# Patient Record
Sex: FEMALE | Race: WHITE | Employment: UNEMPLOYED | URBAN - METROPOLITAN AREA
[De-identification: names, ages, dates, MRNs, and addresses within clinical notes are randomized per-mention and may not be internally consistent; named-entity substitution may affect disease eponyms.]

---

## 2017-03-02 PROBLEM — M77.51 TENDINITIS OF RIGHT ANKLE: Status: ACTIVE | Noted: 2017-03-02

## 2017-07-02 PROBLEM — M77.51 TENDINITIS OF RIGHT ANKLE: Status: RESOLVED | Noted: 2017-03-02 | Resolved: 2017-07-02

## 2017-07-02 PROBLEM — R50.9 FUO (FEVER OF UNKNOWN ORIGIN): Status: ACTIVE | Noted: 2017-07-02

## 2017-07-02 PROBLEM — N17.9 ACUTE KIDNEY INJURY (HCC): Status: ACTIVE | Noted: 2017-07-02

## 2018-02-18 PROBLEM — J18.9 PNEUMONIA DUE TO ORGANISM: Status: ACTIVE | Noted: 2018-02-18

## 2018-02-18 PROBLEM — J18.9 PNEUMONIA: Status: ACTIVE | Noted: 2018-02-18

## 2018-02-19 PROBLEM — R50.9 FUO (FEVER OF UNKNOWN ORIGIN): Status: RESOLVED | Noted: 2017-07-02 | Resolved: 2018-02-19

## 2018-02-19 PROBLEM — I10 ESSENTIAL HYPERTENSION: Chronic | Status: ACTIVE | Noted: 2018-02-19

## 2018-02-19 PROBLEM — N17.9 ACUTE KIDNEY INJURY (HCC): Status: RESOLVED | Noted: 2017-07-02 | Resolved: 2018-02-19

## 2018-02-19 PROBLEM — G47.33 OSA (OBSTRUCTIVE SLEEP APNEA): Chronic | Status: ACTIVE | Noted: 2018-02-19

## 2018-02-19 PROBLEM — J18.9 PNEUMONIA DUE TO ORGANISM: Status: RESOLVED | Noted: 2018-02-18 | Resolved: 2018-02-19

## 2018-02-19 PROBLEM — J10.1 INFLUENZA B: Status: ACTIVE | Noted: 2018-02-19

## 2018-02-20 PROBLEM — A41.9 SEPSIS (HCC): Status: ACTIVE | Noted: 2018-02-20

## 2018-03-16 ENCOUNTER — HOSPITAL ENCOUNTER (OUTPATIENT)
Dept: GENERAL RADIOLOGY | Age: 43
Discharge: HOME | End: 2018-03-18
Payer: COMMERCIAL

## 2018-03-16 ENCOUNTER — HOSPITAL ENCOUNTER (OUTPATIENT)
Age: 43
Discharge: HOME | End: 2018-03-18
Payer: COMMERCIAL

## 2018-03-16 DIAGNOSIS — C90.00 MYELOMA ASSOCIATED AMYLOIDOSIS (HCC): ICD-10-CM

## 2018-03-16 DIAGNOSIS — E85.9 MYELOMA ASSOCIATED AMYLOIDOSIS (HCC): ICD-10-CM

## 2018-03-16 PROCEDURE — 71046 X-RAY EXAM CHEST 2 VIEWS: CPT

## 2019-03-02 ENCOUNTER — HOSPITAL ENCOUNTER (EMERGENCY)
Age: 44
Discharge: HOME | End: 2019-03-02
Attending: EMERGENCY MEDICINE
Payer: COMMERCIAL

## 2019-03-02 ENCOUNTER — APPOINTMENT (OUTPATIENT)
Dept: CT IMAGING | Age: 44
End: 2019-03-02
Payer: COMMERCIAL

## 2019-03-02 VITALS
DIASTOLIC BLOOD PRESSURE: 64 MMHG | HEIGHT: 67 IN | HEART RATE: 68 BPM | SYSTOLIC BLOOD PRESSURE: 136 MMHG | TEMPERATURE: 98.2 F | RESPIRATION RATE: 18 BRPM | OXYGEN SATURATION: 98 % | BODY MASS INDEX: 41.59 KG/M2 | WEIGHT: 265 LBS

## 2019-03-02 DIAGNOSIS — D64.89 OTHER SPECIFIED ANEMIAS: ICD-10-CM

## 2019-03-02 DIAGNOSIS — R10.84 GENERALIZED ABDOMINAL PAIN: Primary | ICD-10-CM

## 2019-03-02 LAB
ALBUMIN SERPL-MCNC: 3.6 G/DL (ref 3.5–5.2)
ALP BLD-CCNC: 90 U/L (ref 35–104)
ALT SERPL-CCNC: 10 U/L (ref 0–32)
ANION GAP SERPL CALCULATED.3IONS-SCNC: 8 MMOL/L (ref 7–16)
APTT: 88.7 SEC (ref 25.7–34.7)
AST SERPL-CCNC: 10 U/L (ref 0–31)
BACTERIA: ABNORMAL /HPF
BILIRUB SERPL-MCNC: 0.3 MG/DL (ref 0–1.2)
BILIRUBIN URINE: NEGATIVE
BLOOD, URINE: ABNORMAL
BUN BLDV-MCNC: 7 MG/DL (ref 6–20)
CALCIUM SERPL-MCNC: 8.8 MG/DL (ref 8.6–10.2)
CHLORIDE BLD-SCNC: 110 MMOL/L (ref 98–107)
CLARITY: ABNORMAL
CO2: 23 MMOL/L (ref 22–29)
COLOR: YELLOW
CREAT SERPL-MCNC: 0.7 MG/DL (ref 0.5–1)
EPITHELIAL CELLS, UA: ABNORMAL /HPF
GFR AFRICAN AMERICAN: >60
GFR NON-AFRICAN AMERICAN: >60 ML/MIN/1.73
GLUCOSE BLD-MCNC: 91 MG/DL (ref 74–99)
GLUCOSE URINE: NEGATIVE MG/DL
INR BLD: 1
KETONES, URINE: NEGATIVE MG/DL
LEUKOCYTE ESTERASE, URINE: NEGATIVE
NITRITE, URINE: NEGATIVE
PH UA: 6.5 (ref 5–9)
POTASSIUM SERPL-SCNC: 4.2 MMOL/L (ref 3.5–5)
PROTEIN UA: ABNORMAL MG/DL
PROTHROMBIN TIME: 11.6 SEC (ref 9.3–12.4)
RBC UA: ABNORMAL /HPF (ref 0–2)
SODIUM BLD-SCNC: 141 MMOL/L (ref 132–146)
SPECIFIC GRAVITY UA: <=1.005 (ref 1–1.03)
TOTAL PROTEIN: 6.3 G/DL (ref 6.4–8.3)
UROBILINOGEN, URINE: 0.2 E.U./DL
WBC UA: ABNORMAL /HPF (ref 0–5)

## 2019-03-02 PROCEDURE — 2580000003 HC RX 258: Performed by: EMERGENCY MEDICINE

## 2019-03-02 PROCEDURE — 85025 COMPLETE CBC W/AUTO DIFF WBC: CPT

## 2019-03-02 PROCEDURE — 6360000004 HC RX CONTRAST MEDICATION: Performed by: RADIOLOGY

## 2019-03-02 PROCEDURE — 36415 COLL VENOUS BLD VENIPUNCTURE: CPT

## 2019-03-02 PROCEDURE — 80053 COMPREHEN METABOLIC PANEL: CPT

## 2019-03-02 PROCEDURE — 99284 EMERGENCY DEPT VISIT MOD MDM: CPT

## 2019-03-02 PROCEDURE — 85730 THROMBOPLASTIN TIME PARTIAL: CPT

## 2019-03-02 PROCEDURE — 74177 CT ABD & PELVIS W/CONTRAST: CPT

## 2019-03-02 PROCEDURE — 87088 URINE BACTERIA CULTURE: CPT

## 2019-03-02 PROCEDURE — 85610 PROTHROMBIN TIME: CPT

## 2019-03-02 PROCEDURE — 81001 URINALYSIS AUTO W/SCOPE: CPT

## 2019-03-02 RX ORDER — SODIUM CHLORIDE 0.9 % (FLUSH) 0.9 %
10 SYRINGE (ML) INJECTION PRN
Status: DISCONTINUED | OUTPATIENT
Start: 2019-03-02 | End: 2019-03-03 | Stop reason: HOSPADM

## 2019-03-02 RX ORDER — 0.9 % SODIUM CHLORIDE 0.9 %
1000 INTRAVENOUS SOLUTION INTRAVENOUS ONCE
Status: COMPLETED | OUTPATIENT
Start: 2019-03-02 | End: 2019-03-02

## 2019-03-02 RX ADMIN — IOPAMIDOL 100 ML: 755 INJECTION, SOLUTION INTRAVENOUS at 20:05

## 2019-03-02 RX ADMIN — IOPAMIDOL 100 ML: 755 INJECTION, SOLUTION INTRAVENOUS at 22:51

## 2019-03-02 RX ADMIN — SODIUM CHLORIDE 1000 ML: 9 INJECTION, SOLUTION INTRAVENOUS at 19:30

## 2019-03-02 ASSESSMENT — PAIN DESCRIPTION - DESCRIPTORS: DESCRIPTORS: DISCOMFORT;DULL;SORE

## 2019-03-02 ASSESSMENT — PAIN DESCRIPTION - PROGRESSION: CLINICAL_PROGRESSION: NOT CHANGED

## 2019-03-02 ASSESSMENT — PAIN DESCRIPTION - LOCATION: LOCATION: ABDOMEN

## 2019-03-02 ASSESSMENT — PAIN DESCRIPTION - PAIN TYPE: TYPE: ACUTE PAIN

## 2019-03-02 ASSESSMENT — PAIN DESCRIPTION - ONSET: ONSET: GRADUAL

## 2019-03-02 ASSESSMENT — PAIN SCALES - GENERAL: PAINLEVEL_OUTOF10: 6

## 2019-03-02 ASSESSMENT — PAIN DESCRIPTION - FREQUENCY: FREQUENCY: CONTINUOUS

## 2019-03-03 LAB
ANISOCYTOSIS: ABNORMAL
BASOPHILS ABSOLUTE: 0.04 E9/L (ref 0–0.2)
BASOPHILS RELATIVE PERCENT: 1.2 % (ref 0–2)
EOSINOPHILS ABSOLUTE: 0.05 E9/L (ref 0.05–0.5)
EOSINOPHILS RELATIVE PERCENT: 1.4 % (ref 0–6)
HCT VFR BLD CALC: 27.6 % (ref 34–48)
HEMOGLOBIN: 7.9 G/DL (ref 11.5–15.5)
HYPOCHROMIA: ABNORMAL
IMMATURE GRANULOCYTES #: 0.01 E9/L
IMMATURE GRANULOCYTES %: 0.3 % (ref 0–5)
LYMPHOCYTES ABSOLUTE: 2.16 E9/L (ref 1.5–4)
LYMPHOCYTES RELATIVE PERCENT: 62.6 % (ref 20–42)
MCH RBC QN AUTO: 23.5 PG (ref 26–35)
MCHC RBC AUTO-ENTMCNC: 28.6 % (ref 32–34.5)
MCV RBC AUTO: 82.1 FL (ref 80–99.9)
MONOCYTES ABSOLUTE: 0.24 E9/L (ref 0.1–0.95)
MONOCYTES RELATIVE PERCENT: 7 % (ref 2–12)
NEUTROPHILS ABSOLUTE: 0.95 E9/L (ref 1.8–7.3)
NEUTROPHILS RELATIVE PERCENT: 27.5 % (ref 43–80)
OVALOCYTES: ABNORMAL
PDW BLD-RTO: 21.2 FL (ref 11.5–15)
PLATELET # BLD: 215 E9/L (ref 130–450)
PMV BLD AUTO: 9.5 FL (ref 7–12)
POIKILOCYTES: ABNORMAL
POLYCHROMASIA: ABNORMAL
RBC # BLD: 3.36 E12/L (ref 3.5–5.5)
TEAR DROP CELLS: ABNORMAL
WBC # BLD: 3.5 E9/L (ref 4.5–11.5)

## 2019-03-05 LAB — URINE CULTURE, ROUTINE: NORMAL

## 2019-06-11 ENCOUNTER — HOSPITAL ENCOUNTER (OUTPATIENT)
Dept: GENERAL RADIOLOGY | Age: 44
Discharge: HOME | End: 2019-06-13
Payer: COMMERCIAL

## 2019-06-11 ENCOUNTER — HOSPITAL ENCOUNTER (OUTPATIENT)
Age: 44
Discharge: HOME | End: 2019-06-13
Payer: COMMERCIAL

## 2019-06-11 DIAGNOSIS — M25.571 PAIN, JOINT, FOOT, RIGHT: ICD-10-CM

## 2019-06-11 PROCEDURE — 73630 X-RAY EXAM OF FOOT: CPT

## 2019-06-18 PROBLEM — E51.9: Status: ACTIVE | Noted: 2019-06-18

## 2019-09-18 ENCOUNTER — HOSPITAL ENCOUNTER (INPATIENT)
Age: 44
LOS: 3 days | Discharge: HOME | DRG: 660 | End: 2019-09-21
Attending: EMERGENCY MEDICINE | Admitting: FAMILY MEDICINE
Payer: COMMERCIAL

## 2019-09-18 ENCOUNTER — APPOINTMENT (OUTPATIENT)
Dept: GENERAL RADIOLOGY | Age: 44
DRG: 660 | End: 2019-09-18
Payer: COMMERCIAL

## 2019-09-18 DIAGNOSIS — R50.81 NEUTROPENIC FEVER (HCC): Primary | ICD-10-CM

## 2019-09-18 DIAGNOSIS — D70.9 NEUTROPENIC FEVER (HCC): Primary | ICD-10-CM

## 2019-09-18 DIAGNOSIS — R09.02 HYPOXIA: ICD-10-CM

## 2019-09-18 LAB
ALBUMIN SERPL-MCNC: 3.9 G/DL (ref 3.5–5.2)
ALP BLD-CCNC: 92 U/L (ref 35–104)
ALT SERPL-CCNC: 30 U/L (ref 0–32)
ANION GAP SERPL CALCULATED.3IONS-SCNC: 11 MMOL/L (ref 7–16)
ANISOCYTOSIS: ABNORMAL
AST SERPL-CCNC: 30 U/L (ref 0–31)
BACTERIA: ABNORMAL /HPF
BASOPHILS ABSOLUTE: 0.01 E9/L (ref 0–0.2)
BASOPHILS RELATIVE PERCENT: 0.9 % (ref 0–2)
BILIRUB SERPL-MCNC: 0.4 MG/DL (ref 0–1.2)
BILIRUBIN URINE: ABNORMAL
BLOOD, URINE: ABNORMAL
BUN BLDV-MCNC: 16 MG/DL (ref 6–20)
BURR CELLS: ABNORMAL
CALCIUM SERPL-MCNC: 8.4 MG/DL (ref 8.6–10.2)
CHLORIDE BLD-SCNC: 109 MMOL/L (ref 98–107)
CLARITY: ABNORMAL
CO2: 21 MMOL/L (ref 22–29)
COLOR: YELLOW
CREAT SERPL-MCNC: 1.3 MG/DL (ref 0.5–1)
D DIMER: 301 NG/ML DDU
EOSINOPHILS ABSOLUTE: 0.04 E9/L (ref 0.05–0.5)
EOSINOPHILS RELATIVE PERCENT: 3.5 % (ref 0–6)
EPITHELIAL CELLS, UA: ABNORMAL /HPF
GFR AFRICAN AMERICAN: 53
GFR NON-AFRICAN AMERICAN: 44 ML/MIN/1.73
GLUCOSE BLD-MCNC: 109 MG/DL (ref 74–99)
GLUCOSE URINE: NEGATIVE MG/DL
HCT VFR BLD CALC: 26.8 % (ref 34–48)
HEMOGLOBIN: 7.9 G/DL (ref 11.5–15.5)
KETONES, URINE: ABNORMAL MG/DL
LACTIC ACID: 2 MMOL/L (ref 0.5–2.2)
LEUKOCYTE ESTERASE, URINE: NEGATIVE
LYMPHOCYTES ABSOLUTE: 0.58 E9/L (ref 1.5–4)
LYMPHOCYTES RELATIVE PERCENT: 52.6 % (ref 20–42)
MCH RBC QN AUTO: 24.7 PG (ref 26–35)
MCHC RBC AUTO-ENTMCNC: 29.5 % (ref 32–34.5)
MCV RBC AUTO: 83.8 FL (ref 80–99.9)
METAMYELOCYTES RELATIVE PERCENT: 1.8 % (ref 0–1)
MONOCYTES ABSOLUTE: 0.1 E9/L (ref 0.1–0.95)
MONOCYTES RELATIVE PERCENT: 8.8 % (ref 2–12)
MYELOCYTE PERCENT: 0.9 % (ref 0–0)
NEUTROPHILS ABSOLUTE: 0.37 E9/L (ref 1.8–7.3)
NEUTROPHILS RELATIVE PERCENT: 31.6 % (ref 43–80)
NITRITE, URINE: NEGATIVE
NUCLEATED RED BLOOD CELLS: 10.5 /100 WBC
OVALOCYTES: ABNORMAL
PDW BLD-RTO: 20.2 FL (ref 11.5–15)
PH UA: 5 (ref 5–9)
PLATELET # BLD: 150 E9/L (ref 130–450)
PMV BLD AUTO: 9.1 FL (ref 7–12)
POIKILOCYTES: ABNORMAL
POLYCHROMASIA: ABNORMAL
POTASSIUM SERPL-SCNC: 4.2 MMOL/L (ref 3.5–5)
PROTEIN UA: 100 MG/DL
RBC # BLD: 3.2 E12/L (ref 3.5–5.5)
RBC UA: ABNORMAL /HPF (ref 0–2)
SCHISTOCYTES: ABNORMAL
SODIUM BLD-SCNC: 141 MMOL/L (ref 132–146)
SPECIFIC GRAVITY UA: >=1.03 (ref 1–1.03)
TEAR DROP CELLS: ABNORMAL
TOTAL PROTEIN: 6.2 G/DL (ref 6.4–8.3)
TROPONIN: <0.01 NG/ML (ref 0–0.03)
UROBILINOGEN, URINE: 0.2 E.U./DL
WBC # BLD: 1.1 E9/L (ref 4.5–11.5)
WBC UA: ABNORMAL /HPF (ref 0–5)

## 2019-09-18 PROCEDURE — 87040 BLOOD CULTURE FOR BACTERIA: CPT

## 2019-09-18 PROCEDURE — 71045 X-RAY EXAM CHEST 1 VIEW: CPT

## 2019-09-18 PROCEDURE — 87186 SC STD MICRODIL/AGAR DIL: CPT

## 2019-09-18 PROCEDURE — 2140000000 HC CCU INTERMEDIATE R&B

## 2019-09-18 PROCEDURE — 85025 COMPLETE CBC W/AUTO DIFF WBC: CPT

## 2019-09-18 PROCEDURE — 99285 EMERGENCY DEPT VISIT HI MDM: CPT

## 2019-09-18 PROCEDURE — 81025 URINE PREGNANCY TEST: CPT

## 2019-09-18 PROCEDURE — 81001 URINALYSIS AUTO W/SCOPE: CPT

## 2019-09-18 PROCEDURE — 93005 ELECTROCARDIOGRAM TRACING: CPT | Performed by: NURSE PRACTITIONER

## 2019-09-18 PROCEDURE — 2580000003 HC RX 258: Performed by: EMERGENCY MEDICINE

## 2019-09-18 PROCEDURE — 83605 ASSAY OF LACTIC ACID: CPT

## 2019-09-18 PROCEDURE — 84484 ASSAY OF TROPONIN QUANT: CPT

## 2019-09-18 PROCEDURE — 80053 COMPREHEN METABOLIC PANEL: CPT

## 2019-09-18 PROCEDURE — 85378 FIBRIN DEGRADE SEMIQUANT: CPT

## 2019-09-18 PROCEDURE — 87077 CULTURE AEROBIC IDENTIFY: CPT

## 2019-09-18 PROCEDURE — 36415 COLL VENOUS BLD VENIPUNCTURE: CPT

## 2019-09-18 PROCEDURE — 87088 URINE BACTERIA CULTURE: CPT

## 2019-09-18 PROCEDURE — 6370000000 HC RX 637 (ALT 250 FOR IP): Performed by: EMERGENCY MEDICINE

## 2019-09-18 RX ORDER — 0.9 % SODIUM CHLORIDE 0.9 %
1000 INTRAVENOUS SOLUTION INTRAVENOUS ONCE
Status: COMPLETED | OUTPATIENT
Start: 2019-09-18 | End: 2019-09-19

## 2019-09-18 RX ORDER — ACETAMINOPHEN 325 MG/1
650 TABLET ORAL ONCE
Status: COMPLETED | OUTPATIENT
Start: 2019-09-18 | End: 2019-09-18

## 2019-09-18 RX ADMIN — ACETAMINOPHEN 650 MG: 325 TABLET ORAL at 21:23

## 2019-09-18 RX ADMIN — SODIUM CHLORIDE 1000 ML: 9 INJECTION, SOLUTION INTRAVENOUS at 21:23

## 2019-09-19 ENCOUNTER — APPOINTMENT (OUTPATIENT)
Dept: CT IMAGING | Age: 44
DRG: 660 | End: 2019-09-19
Payer: COMMERCIAL

## 2019-09-19 PROBLEM — D64.9 NORMOCYTIC ANEMIA: Status: ACTIVE | Noted: 2019-09-19

## 2019-09-19 PROBLEM — N18.9 CKD (CHRONIC KIDNEY DISEASE): Status: ACTIVE | Noted: 2019-09-19

## 2019-09-19 LAB
ABO/RH: NORMAL
ANION GAP SERPL CALCULATED.3IONS-SCNC: 9 MMOL/L (ref 7–16)
ANISOCYTOSIS: ABNORMAL
ANTIBODY IDENTIFICATION: NORMAL
ANTIBODY SCREEN: NORMAL
BASOPHILS ABSOLUTE: 0 E9/L (ref 0–0.2)
BASOPHILS RELATIVE PERCENT: 0.7 % (ref 0–2)
BUN BLDV-MCNC: 19 MG/DL (ref 6–20)
CALCIUM SERPL-MCNC: 7.9 MG/DL (ref 8.6–10.2)
CHLORIDE BLD-SCNC: 108 MMOL/L (ref 98–107)
CO2: 23 MMOL/L (ref 22–29)
CREAT SERPL-MCNC: 1.5 MG/DL (ref 0.5–1)
DR. NOTIFY: NORMAL
EKG ATRIAL RATE: 106 BPM
EKG P AXIS: 45 DEGREES
EKG P-R INTERVAL: 140 MS
EKG Q-T INTERVAL: 324 MS
EKG QRS DURATION: 74 MS
EKG QTC CALCULATION (BAZETT): 430 MS
EKG R AXIS: 49 DEGREES
EKG T AXIS: 32 DEGREES
EKG VENTRICULAR RATE: 106 BPM
EOSINOPHILS ABSOLUTE: 0.07 E9/L (ref 0.05–0.5)
EOSINOPHILS RELATIVE PERCENT: 5.3 % (ref 0–6)
GFR AFRICAN AMERICAN: 45
GFR NON-AFRICAN AMERICAN: 37 ML/MIN/1.73
GLUCOSE BLD-MCNC: 95 MG/DL (ref 74–99)
HCG(URINE) PREGNANCY TEST: NEGATIVE
HCT VFR BLD CALC: 23.2 % (ref 34–48)
HCT VFR BLD CALC: 23.3 % (ref 34–48)
HEMOGLOBIN: 6.6 G/DL (ref 11.5–15.5)
HEMOGLOBIN: 6.8 G/DL (ref 11.5–15.5)
LACTIC ACID: 1.4 MMOL/L (ref 0.5–2.2)
LYMPHOCYTES ABSOLUTE: 0.55 E9/L (ref 1.5–4)
LYMPHOCYTES RELATIVE PERCENT: 38.6 % (ref 20–42)
MCH RBC QN AUTO: 23.9 PG (ref 26–35)
MCHC RBC AUTO-ENTMCNC: 28.4 % (ref 32–34.5)
MCV RBC AUTO: 84.1 FL (ref 80–99.9)
MONOCYTES ABSOLUTE: 0.15 E9/L (ref 0.1–0.95)
MONOCYTES RELATIVE PERCENT: 11.4 % (ref 2–12)
NEUTROPHILS ABSOLUTE: 0.63 E9/L (ref 1.8–7.3)
NEUTROPHILS RELATIVE PERCENT: 44.7 % (ref 43–80)
NUCLEATED RED BLOOD CELLS: 11.4 /100 WBC
PDW BLD-RTO: 20.5 FL (ref 11.5–15)
PLATELET # BLD: 133 E9/L (ref 130–450)
PMV BLD AUTO: 10 FL (ref 7–12)
POIKILOCYTES: ABNORMAL
POLYCHROMASIA: ABNORMAL
POTASSIUM REFLEX MAGNESIUM: 4.4 MMOL/L (ref 3.5–5)
PROCALCITONIN: 16.66 NG/ML (ref 0–0.08)
RBC # BLD: 2.76 E12/L (ref 3.5–5.5)
SODIUM BLD-SCNC: 140 MMOL/L (ref 132–146)
TEAR DROP CELLS: ABNORMAL
WBC # BLD: 1.4 E9/L (ref 4.5–11.5)

## 2019-09-19 PROCEDURE — 80048 BASIC METABOLIC PNL TOTAL CA: CPT

## 2019-09-19 PROCEDURE — 6360000002 HC RX W HCPCS: Performed by: SPECIALIST

## 2019-09-19 PROCEDURE — 85018 HEMOGLOBIN: CPT

## 2019-09-19 PROCEDURE — 6370000000 HC RX 637 (ALT 250 FOR IP): Performed by: FAMILY MEDICINE

## 2019-09-19 PROCEDURE — 6360000002 HC RX W HCPCS: Performed by: EMERGENCY MEDICINE

## 2019-09-19 PROCEDURE — 2580000003 HC RX 258: Performed by: SPECIALIST

## 2019-09-19 PROCEDURE — 6360000002 HC RX W HCPCS: Performed by: FAMILY MEDICINE

## 2019-09-19 PROCEDURE — 6360000004 HC RX CONTRAST MEDICATION: Performed by: RADIOLOGY

## 2019-09-19 PROCEDURE — 85025 COMPLETE CBC W/AUTO DIFF WBC: CPT

## 2019-09-19 PROCEDURE — 2140000000 HC CCU INTERMEDIATE R&B

## 2019-09-19 PROCEDURE — 86880 COOMBS TEST DIRECT: CPT

## 2019-09-19 PROCEDURE — 87040 BLOOD CULTURE FOR BACTERIA: CPT

## 2019-09-19 PROCEDURE — P9016 RBC LEUKOCYTES REDUCED: HCPCS

## 2019-09-19 PROCEDURE — 86971 RBC PRETX INCUBATJ W/ENZYMES: CPT

## 2019-09-19 PROCEDURE — 86922 COMPATIBILITY TEST ANTIGLOB: CPT

## 2019-09-19 PROCEDURE — 86901 BLOOD TYPING SEROLOGIC RH(D): CPT

## 2019-09-19 PROCEDURE — 86870 RBC ANTIBODY IDENTIFICATION: CPT

## 2019-09-19 PROCEDURE — 86900 BLOOD TYPING SEROLOGIC ABO: CPT

## 2019-09-19 PROCEDURE — 86850 RBC ANTIBODY SCREEN: CPT

## 2019-09-19 PROCEDURE — 85014 HEMATOCRIT: CPT

## 2019-09-19 PROCEDURE — 84145 PROCALCITONIN (PCT): CPT

## 2019-09-19 PROCEDURE — 86902 BLOOD TYPE ANTIGEN DONOR EA: CPT

## 2019-09-19 PROCEDURE — 71275 CT ANGIOGRAPHY CHEST: CPT

## 2019-09-19 PROCEDURE — 2580000003 HC RX 258: Performed by: EMERGENCY MEDICINE

## 2019-09-19 PROCEDURE — 93010 ELECTROCARDIOGRAM REPORT: CPT | Performed by: INTERNAL MEDICINE

## 2019-09-19 PROCEDURE — 83605 ASSAY OF LACTIC ACID: CPT

## 2019-09-19 PROCEDURE — 36415 COLL VENOUS BLD VENIPUNCTURE: CPT

## 2019-09-19 PROCEDURE — 36591 DRAW BLOOD OFF VENOUS DEVICE: CPT

## 2019-09-19 PROCEDURE — 2580000003 HC RX 258: Performed by: FAMILY MEDICINE

## 2019-09-19 RX ORDER — ACETAMINOPHEN 325 MG/1
650 TABLET ORAL EVERY 4 HOURS PRN
Status: DISCONTINUED | OUTPATIENT
Start: 2019-09-19 | End: 2019-09-21 | Stop reason: HOSPADM

## 2019-09-19 RX ORDER — SODIUM CHLORIDE 9 MG/ML
INJECTION, SOLUTION INTRAVENOUS PRN
Status: DISCONTINUED | OUTPATIENT
Start: 2019-09-19 | End: 2019-09-20

## 2019-09-19 RX ORDER — 0.9 % SODIUM CHLORIDE 0.9 %
250 INTRAVENOUS SOLUTION INTRAVENOUS ONCE
Status: DISCONTINUED | OUTPATIENT
Start: 2019-09-19 | End: 2019-09-21 | Stop reason: HOSPADM

## 2019-09-19 RX ORDER — OXYCODONE HYDROCHLORIDE 10 MG/1
10 TABLET ORAL EVERY 4 HOURS PRN
Status: DISCONTINUED | OUTPATIENT
Start: 2019-09-19 | End: 2019-09-21 | Stop reason: HOSPADM

## 2019-09-19 RX ORDER — SODIUM CHLORIDE 0.9 % (FLUSH) 0.9 %
10 SYRINGE (ML) INJECTION PRN
Status: DISCONTINUED | OUTPATIENT
Start: 2019-09-19 | End: 2019-09-21 | Stop reason: HOSPADM

## 2019-09-19 RX ORDER — SODIUM CHLORIDE 9 MG/ML
INJECTION, SOLUTION INTRAVENOUS CONTINUOUS
Status: DISCONTINUED | OUTPATIENT
Start: 2019-09-19 | End: 2019-09-21

## 2019-09-19 RX ORDER — ACYCLOVIR 800 MG/1
400 TABLET ORAL 2 TIMES DAILY
Status: DISCONTINUED | OUTPATIENT
Start: 2019-09-19 | End: 2019-09-21 | Stop reason: HOSPADM

## 2019-09-19 RX ORDER — PREGABALIN 75 MG/1
150 CAPSULE ORAL DAILY
Status: DISCONTINUED | OUTPATIENT
Start: 2019-09-19 | End: 2019-09-21 | Stop reason: HOSPADM

## 2019-09-19 RX ORDER — HEPARIN SODIUM (PORCINE) LOCK FLUSH IV SOLN 100 UNIT/ML 100 UNIT/ML
300 SOLUTION INTRAVENOUS EVERY 12 HOURS
Status: DISCONTINUED | OUTPATIENT
Start: 2019-09-19 | End: 2019-09-21 | Stop reason: HOSPADM

## 2019-09-19 RX ORDER — ZOLPIDEM TARTRATE 5 MG/1
5 TABLET ORAL NIGHTLY PRN
Status: DISCONTINUED | OUTPATIENT
Start: 2019-09-19 | End: 2019-09-21 | Stop reason: HOSPADM

## 2019-09-19 RX ORDER — SODIUM CHLORIDE 0.9 % (FLUSH) 0.9 %
10 SYRINGE (ML) INJECTION EVERY 12 HOURS SCHEDULED
Status: DISCONTINUED | OUTPATIENT
Start: 2019-09-19 | End: 2019-09-21 | Stop reason: HOSPADM

## 2019-09-19 RX ADMIN — CEFEPIME HYDROCHLORIDE 2 G: 2 INJECTION, POWDER, FOR SOLUTION INTRAVENOUS at 00:06

## 2019-09-19 RX ADMIN — OXYCODONE HYDROCHLORIDE 10 MG: 10 TABLET ORAL at 13:55

## 2019-09-19 RX ADMIN — ACYCLOVIR 400 MG: 800 TABLET ORAL at 20:21

## 2019-09-19 RX ADMIN — ZOLPIDEM TARTRATE 5 MG: 5 TABLET ORAL at 22:18

## 2019-09-19 RX ADMIN — HEPARIN 300 UNITS: 100 SYRINGE at 13:55

## 2019-09-19 RX ADMIN — SODIUM CHLORIDE: 9 INJECTION, SOLUTION INTRAVENOUS at 05:46

## 2019-09-19 RX ADMIN — ENOXAPARIN SODIUM 40 MG: 40 INJECTION SUBCUTANEOUS at 08:22

## 2019-09-19 RX ADMIN — VANCOMYCIN HYDROCHLORIDE 2500 MG: 10 INJECTION, POWDER, LYOPHILIZED, FOR SOLUTION INTRAVENOUS at 05:46

## 2019-09-19 RX ADMIN — SODIUM CHLORIDE: 9 INJECTION, SOLUTION INTRAVENOUS at 20:09

## 2019-09-19 RX ADMIN — CEFEPIME HYDROCHLORIDE 1 G: 1 INJECTION, POWDER, FOR SOLUTION INTRAMUSCULAR; INTRAVENOUS at 08:24

## 2019-09-19 RX ADMIN — IOPAMIDOL 75 ML: 755 INJECTION, SOLUTION INTRAVENOUS at 02:11

## 2019-09-19 RX ADMIN — Medication 10 ML: at 08:31

## 2019-09-19 RX ADMIN — PREGABALIN 150 MG: 75 CAPSULE ORAL at 08:22

## 2019-09-19 RX ADMIN — Medication 10 ML: at 20:23

## 2019-09-19 RX ADMIN — ACYCLOVIR 400 MG: 800 TABLET ORAL at 08:23

## 2019-09-19 RX ADMIN — CEFTAROLINE FOSAMIL 600 MG: 600 POWDER, FOR SOLUTION INTRAVENOUS at 12:01

## 2019-09-19 RX ADMIN — Medication 10 ML: at 05:46

## 2019-09-19 ASSESSMENT — PAIN SCALES - GENERAL
PAINLEVEL_OUTOF10: 0
PAINLEVEL_OUTOF10: 7

## 2019-09-19 NOTE — CONSULTS
[address] [place] [address] [facility]  DONYA    Consultation Note     Admit Date: 9/18/2019  8:45 PM    Reason for Consult:   Neutropenic fever    Attending Physician:  Adam Moore MD     Chief Complaint: Shakes and chills about 5 hours after chemo    HISTORY OF PRESENT ILLNESS:   The patient is a 53 y.o. Afro-American woman known to the Infectious Diseases service. The patient is admitted through the emergency room after having chills fevers and Reiger's with temperatures of 103. She developed these around 3:30 PM on 18 September. She has just been switched back to chemotherapy that includes  KYPROLIS. Patient claims that she had a similar problem when she was initiated on her previous chemo. She had been on that chemo for 2 years until about a week or so ago when they decided to switch her over to Kyprolix. Yesterday's dose was doubled the original dose a week before and she developed fevers chills and rigors similar to when she had her initiation from previous chemo. Patient has been treated for multiple myeloma which has still been come somewhat refractory in spite of the bone marrow transplant. She also had a recent renal lesion that was biopsied at the [facility] clinic. Patient has no headaches cough sore throat chest pain pleuritic pain or abdominal pain. There is no nausea vomiting diarrhea no frequency burning dysuria or flank pain she does have chronic back pain. None of her children are ill she has not traveled and there is no animals or pets in the household. Emergency room she was started on Vanco and cefepime. Her ANC is 370. Blood cultures and urine cultures are pending. Patient has some chronic renal insufficiency with a creatinine of 1.5.     Past Medical History:        Diagnosis Date    Anemia associated with multiple myeloma treated with erythropoietin ([facility] [address]     Asthma     Degeneration of lumbar or lumbosacral intervertebral disc     Kidney lesion     Multiple

## 2019-09-19 NOTE — PROGRESS NOTES
Spoke will blood bank. They said patient's blood has antibodies so it will take them awhile before we will receive it. They will call and let us know when it is ready.

## 2019-09-19 NOTE — ED PROVIDER NOTES
E12/L    Hemoglobin 7.9 (L) 11.5 - 15.5 g/dL    Hematocrit 26.8 (L) 34.0 - 48.0 %    MCV 83.8 80.0 - 99.9 fL    MCH 24.7 (L) 26.0 - 35.0 pg    MCHC 29.5 (L) 32.0 - 34.5 %    RDW 20.2 (H) 11.5 - 15.0 fL    Platelets 844 291 - 004 E9/L    MPV 9.1 7.0 - 12.0 fL    Neutrophils % 31.6 (L) 43.0 - 80.0 %    Lymphocytes % 52.6 (H) 20.0 - 42.0 %    Monocytes % 8.8 2.0 - 12.0 %    Eosinophils % 3.5 0.0 - 6.0 %    Basophils % 0.9 0.0 - 2.0 %    Neutrophils Absolute 0.37 (L) 1.80 - 7.30 E9/L    Lymphocytes Absolute 0.58 (L) 1.50 - 4.00 E9/L    Monocytes Absolute 0.10 0.10 - 0.95 E9/L    Eosinophils Absolute 0.04 (L) 0.05 - 0.50 E9/L    Basophils Absolute 0.01 0.00 - 0.20 E9/L    Metamyelocytes Relative 1.8 (H) 0.0 - 1.0 %    Myelocyte Percent 0.9 0 - 0 %    nRBC 10.5 /100 WBC    Anisocytosis 1+     Polychromasia 2+     Poikilocytes 3+     Schistocytes 1+     [place] Cells 1+     Ovalocytes 3+     Tear Drop Cells 3+    Urinalysis   Result Value Ref Range    Color, UA Yellow Straw/Yellow    Clarity, UA SL CLOUDY Clear    Glucose, Ur Negative Negative mg/dL    Bilirubin Urine SMALL (A) Negative    Ketones, Urine TRACE (A) Negative mg/dL    Specific Gravity, UA >=1.030 1.005 - 1.030    Blood, Urine TRACE-INTACT Negative    pH, UA 5.0 5.0 - 9.0    Protein,  (A) Negative mg/dL    Urobilinogen, Urine 0.2 <2.0 E.U./dL    Nitrite, Urine Negative Negative    Leukocyte Esterase, Urine Negative Negative   Troponin   Result Value Ref Range    Troponin <0.01 0.00 - 0.03 ng/mL   Lactic Acid, Plasma   Result Value Ref Range    Lactic Acid 2.0 0.5 - 2.2 mmol/L   D-Dimer, Quantitative   Result Value Ref Range    D-Dimer, Quant 301 ng/mL DDU   EKG 12 Lead   Result Value Ref Range    Ventricular Rate 106 BPM    Atrial Rate 106 BPM    P-R Interval 140 ms    QRS Duration 74 ms    Q-T Interval 324 ms    QTc Calculation (Bazett) 430 ms    P Axis 45 degrees    R Axis 49 degrees    T Axis 32 degrees       RADIOLOGY:  Interpreted by Radiologist.  XR

## 2019-09-19 NOTE — CARE COORDINATION
JUANITA Discharge planning:    SW met with patient. Patient lives with her 2 children in a 2 story home. Patient owns a ww, shower chair and bsc. Has a [facility] of [address] [facility]. Patients plan is to return home when medically stable with no anticipated discharge needs. Pharmacy is  [facility] on [phone]. Patients sister or brother in law will provide the transportation home. Will follow.  Aracely Lopez

## 2019-09-19 NOTE — PROGRESS NOTES
Pharmacy Consultation Note  (Antibiotic Dosing and Monitoring)      Pharmacy is following for Vancomycin dosing. Allergies:  Patient has no known allergies. 53 y.o. female    Ht Readings from Last 1 Encounters:   09/19/19 5' 7\" (1.702 m)     Wt Readings from Last 1 Encounters:   09/19/19 268 lb 11.2 oz (121.9 kg)       Recent Labs     09/18/19 2118   WBC 1.1*       Recent Labs     09/18/19 2118   BUN 16   CREATININE 1.3*       Estimated Creatinine Clearance: 73 mL/min (A) (based on SCr of 1.3 mg/dL (H)). No intake or output data in the 24 hours ending 09/19/19 0439    Cultures:  available culture and sensitivity results were reviewed in [facility]      Assessment:  Consulted by Dr. Geetha Lagunas to dose/monitor vancomycin  Estimated CrCl = 73 mL/min  Goal trough level = 15-20 mcg/mL    Plan:   Will initiate vancomycin at a dose of 2500 mg  Monitor renal function   Clinical pharmacy will follow up and complete full consult note  pneumonia      BETZY Lucas MERVAT [place] [facility] 9/19/2019 4:39 AM

## 2019-09-20 LAB
ANION GAP SERPL CALCULATED.3IONS-SCNC: 8 MMOL/L (ref 7–16)
ANISOCYTOSIS: ABNORMAL
BASOPHILS ABSOLUTE: 0.02 E9/L (ref 0–0.2)
BASOPHILS RELATIVE PERCENT: 0.9 % (ref 0–2)
BLOOD BANK DISPENSE STATUS: NORMAL
BLOOD BANK PRODUCT CODE: NORMAL
BPU ID: NORMAL
BUN BLDV-MCNC: 19 MG/DL (ref 6–20)
CALCIUM SERPL-MCNC: 7.2 MG/DL (ref 8.6–10.2)
CHLORIDE BLD-SCNC: 108 MMOL/L (ref 98–107)
CO2: 21 MMOL/L (ref 22–29)
CREAT SERPL-MCNC: 1.2 MG/DL (ref 0.5–1)
DESCRIPTION BLOOD BANK: NORMAL
EOSINOPHILS ABSOLUTE: 0.09 E9/L (ref 0.05–0.5)
EOSINOPHILS RELATIVE PERCENT: 4.4 % (ref 0–6)
GFR AFRICAN AMERICAN: 58
GFR NON-AFRICAN AMERICAN: 48 ML/MIN/1.73
GLUCOSE BLD-MCNC: 85 MG/DL (ref 74–99)
HCT VFR BLD CALC: 22.2 % (ref 34–48)
HCT VFR BLD CALC: 28.3 % (ref 34–48)
HEMOGLOBIN: 6.5 G/DL (ref 11.5–15.5)
HEMOGLOBIN: 8.6 G/DL (ref 11.5–15.5)
LYMPHOCYTES ABSOLUTE: 1.22 E9/L (ref 1.5–4)
LYMPHOCYTES RELATIVE PERCENT: 57.9 % (ref 20–42)
MCH RBC QN AUTO: 24.9 PG (ref 26–35)
MCHC RBC AUTO-ENTMCNC: 29.3 % (ref 32–34.5)
MCV RBC AUTO: 85.1 FL (ref 80–99.9)
MONOCYTES ABSOLUTE: 0.04 E9/L (ref 0.1–0.95)
MONOCYTES RELATIVE PERCENT: 1.8 % (ref 2–12)
NEUTROPHILS ABSOLUTE: 0.74 E9/L (ref 1.8–7.3)
NEUTROPHILS RELATIVE PERCENT: 35.1 % (ref 43–80)
NUCLEATED RED BLOOD CELLS: 4.4 /100 WBC
OVALOCYTES: ABNORMAL
PDW BLD-RTO: 21 FL (ref 11.5–15)
PLATELET # BLD: 123 E9/L (ref 130–450)
PMV BLD AUTO: 9.4 FL (ref 7–12)
POIKILOCYTES: ABNORMAL
POTASSIUM REFLEX MAGNESIUM: 4.6 MMOL/L (ref 3.5–5)
RBC # BLD: 2.61 E12/L (ref 3.5–5.5)
SCHISTOCYTES: ABNORMAL
SODIUM BLD-SCNC: 137 MMOL/L (ref 132–146)
TARGET CELLS: ABNORMAL
TEAR DROP CELLS: ABNORMAL
URINE CULTURE, ROUTINE: NORMAL
WBC # BLD: 2.1 E9/L (ref 4.5–11.5)

## 2019-09-20 PROCEDURE — 6360000002 HC RX W HCPCS: Performed by: FAMILY MEDICINE

## 2019-09-20 PROCEDURE — 36415 COLL VENOUS BLD VENIPUNCTURE: CPT

## 2019-09-20 PROCEDURE — 2580000003 HC RX 258: Performed by: FAMILY MEDICINE

## 2019-09-20 PROCEDURE — 6360000002 HC RX W HCPCS: Performed by: SPECIALIST

## 2019-09-20 PROCEDURE — 85014 HEMATOCRIT: CPT

## 2019-09-20 PROCEDURE — 6370000000 HC RX 637 (ALT 250 FOR IP): Performed by: FAMILY MEDICINE

## 2019-09-20 PROCEDURE — 2580000003 HC RX 258: Performed by: SPECIALIST

## 2019-09-20 PROCEDURE — 85018 HEMOGLOBIN: CPT

## 2019-09-20 PROCEDURE — 80048 BASIC METABOLIC PNL TOTAL CA: CPT

## 2019-09-20 PROCEDURE — 2140000000 HC CCU INTERMEDIATE R&B

## 2019-09-20 PROCEDURE — 36430 TRANSFUSION BLD/BLD COMPNT: CPT

## 2019-09-20 PROCEDURE — 82784 ASSAY IGA/IGD/IGG/IGM EACH: CPT

## 2019-09-20 PROCEDURE — 85025 COMPLETE CBC W/AUTO DIFF WBC: CPT

## 2019-09-20 PROCEDURE — 36591 DRAW BLOOD OFF VENOUS DEVICE: CPT

## 2019-09-20 RX ORDER — 0.9 % SODIUM CHLORIDE 0.9 %
250 INTRAVENOUS SOLUTION INTRAVENOUS ONCE
Status: COMPLETED | OUTPATIENT
Start: 2019-09-20 | End: 2019-09-20

## 2019-09-20 RX ORDER — 0.9 % SODIUM CHLORIDE 0.9 %
250 INTRAVENOUS SOLUTION INTRAVENOUS ONCE
Status: DISCONTINUED | OUTPATIENT
Start: 2019-09-20 | End: 2019-09-21 | Stop reason: HOSPADM

## 2019-09-20 RX ADMIN — CEFTAROLINE FOSAMIL 600 MG: 600 POWDER, FOR SOLUTION INTRAVENOUS at 14:28

## 2019-09-20 RX ADMIN — ENOXAPARIN SODIUM 40 MG: 40 INJECTION SUBCUTANEOUS at 11:51

## 2019-09-20 RX ADMIN — ZOLPIDEM TARTRATE 5 MG: 5 TABLET ORAL at 23:51

## 2019-09-20 RX ADMIN — SODIUM CHLORIDE 250 ML: 9 INJECTION, SOLUTION INTRAVENOUS at 21:15

## 2019-09-20 RX ADMIN — TBO-FILGRASTIM 300 MCG: 300 INJECTION, SOLUTION SUBCUTANEOUS at 18:23

## 2019-09-20 RX ADMIN — Medication 10 ML: at 11:52

## 2019-09-20 RX ADMIN — ACYCLOVIR 400 MG: 800 TABLET ORAL at 20:20

## 2019-09-20 RX ADMIN — SODIUM CHLORIDE: 9 INJECTION, SOLUTION INTRAVENOUS at 08:21

## 2019-09-20 RX ADMIN — TBO-FILGRASTIM 300 MCG: 300 INJECTION, SOLUTION SUBCUTANEOUS at 00:30

## 2019-09-20 RX ADMIN — ACYCLOVIR 400 MG: 800 TABLET ORAL at 11:51

## 2019-09-20 RX ADMIN — PREGABALIN 150 MG: 75 CAPSULE ORAL at 11:51

## 2019-09-20 RX ADMIN — CEFTAROLINE FOSAMIL 600 MG: 600 POWDER, FOR SOLUTION INTRAVENOUS at 00:30

## 2019-09-20 RX ADMIN — CEFTAROLINE FOSAMIL 600 MG: 600 POWDER, FOR SOLUTION INTRAVENOUS at 23:42

## 2019-09-20 ASSESSMENT — PAIN DESCRIPTION - PAIN TYPE: TYPE: ACUTE PAIN

## 2019-09-20 ASSESSMENT — PAIN SCALES - GENERAL
PAINLEVEL_OUTOF10: 0
PAINLEVEL_OUTOF10: 5
PAINLEVEL_OUTOF10: 0

## 2019-09-20 ASSESSMENT — PAIN DESCRIPTION - PROGRESSION: CLINICAL_PROGRESSION: GRADUALLY IMPROVING

## 2019-09-20 NOTE — PROGRESS NOTES
Spoke with Dr Jose Mercado re: BP 84/50. Patient states she feels tired but denies other symptoms. Orders obtained to hang second unit PRBCs as ordered and to administer a 250ml normal saline bolus after transfusion is complete.

## 2019-09-20 NOTE — PROGRESS NOTES
[facility]  DONYA  Progress Note    SUBJECTIVE:  Chief Complaint   Patient presents with    Fever     started new IV chemo today. Fever 103 at home took motrin, sob, tachy     Patient is tolerating medications. No reported adverse drug reactions. No nausea, vomiting, diarrhea. She has no complaints of headaches cough sore throat no abdominal pain chest pain no pain in the extremities and no drainage from the perirectal area. Feels much better today especially after the first unit of blood. Patient has defervesced rapidly and has been afebrile the last 18 hours. Patient is still neutropenic although the white count is 2.1 her [address] is 740  Cultures are pending  Getting daily Neupogen and today planning for 2 to 3 units of blood  Review of systems:  As stated above in the chief complaint, otherwise negative. Medications:  Scheduled Meds:   sodium chloride  250 mL Intravenous Once    acyclovir  400 mg Oral BID    enoxaparin  40 mg Subcutaneous Daily    pregabalin  150 mg Oral Daily    sodium chloride flush  10 mL Intravenous 2 times per day    sodium chloride  250 mL Intravenous Once    ceftaroline fosamil (TEFLARO) 600 MG IVPB  600 mg Intravenous Q12H    heparin flush  300 Units Intracatheter Q12H    Tbo-Filgrastim  300 mcg Subcutaneous QPM     Continuous Infusions:   sodium chloride 100 mL/hr at 19 0821    sodium chloride       PRN Meds:oxyCODONE HCl, zolpidem, sodium chloride flush, acetaminophen, sodium chloride    OBJECTIVE:  /62   Pulse 77   Temp 97.2 °F (36.2 °C)   Resp 22   Ht 5' 7\" (1.702 m)   Wt 272 lb 3.2 oz (123.5 kg)   SpO2 97%   BMI 42.63 kg/m²   Temp  Av.7 °F (36.5 °C)  Min: 97.2 °F (36.2 °C)  Max: 99 °F (37.2 °C)  Constitutional: The patient is awake, alert, and oriented. Skin: Warm and dry. No rashes were noted. HEENT: Round and reactive pupils. Moist mucous membranes. No ulcerations or thrush.   Neck: Supple to

## 2019-09-20 NOTE — PROGRESS NOTES
Hospitalist Progress Note      Synopsis: Patient admitted on 2019 for Neutropenic fever [facility])      Subjective    Patient seen and examined  Records reviewed. No current complaints  Transfusion of 1 pack of RBC first bag noted    Stable overnight. No other overnight issues reported. Denies pain, shortness of breath, fevers  Temp (24hrs), Av.7 °F (36.5 °C), Min:97.2 °F (36.2 °C), Max:99 °F (37.2 °C)      DIET: DIET GENERAL; Low Microbial    CODE: Full Code      Intake/Output Summary (Last 24 hours) at 2019 1354  Last data filed at 2019 1310  Gross per 24 hour   Intake 1810 ml   Output 1750 ml   Net 60 ml       Exam:  /62   Pulse 77   Temp 97.2 °F (36.2 °C)   Resp 22   Ht 5' 7\" (1.702 m)   Wt 272 lb 3.2 oz (123.5 kg)   SpO2 97%   BMI 42.63 kg/m²   General appearance: No apparent distress, appears stated age and cooperative. HEENT:  Conjunctivae/corneas clear. Neck: Supple. No jugular venous distention. Respiratory: Clear to auscultation bilaterally, normal respiratory effort  Cardiovascular: Regular rate rhythm, normal S1-S2  Abdomen: Soft, nontender, nondistended  Musculoskeletal: No clubbing, cyanosis, no bilateral lower extremity edema. Brisk capillary refill.   Left anterior chest wall Mediport noted  Skin:  No rashes  on visible skin  Neurologic: awake, alert and following commands     Medications:  Reviewed    Infusion Medications    sodium chloride 100 mL/hr at 19 0821    sodium chloride       Scheduled Medications    sodium chloride  250 mL Intravenous Once    acyclovir  400 mg Oral BID    enoxaparin  40 mg Subcutaneous Daily    pregabalin  150 mg Oral Daily    sodium chloride flush  10 mL Intravenous 2 times per day    sodium chloride  250 mL Intravenous Once    ceftaroline fosamil (TEFLARO) 600 MG IVPB  600 mg Intravenous Q12H    heparin flush  300 Units Intracatheter Q12H    Tbo-Filgrastim  300 mcg Subcutaneous QPM     PRN Meds: oxyCODONE HCl,

## 2019-09-21 VITALS
DIASTOLIC BLOOD PRESSURE: 76 MMHG | RESPIRATION RATE: 16 BRPM | WEIGHT: 273.9 LBS | HEIGHT: 67 IN | OXYGEN SATURATION: 97 % | SYSTOLIC BLOOD PRESSURE: 114 MMHG | HEART RATE: 72 BPM | TEMPERATURE: 98.5 F | BODY MASS INDEX: 42.99 KG/M2

## 2019-09-21 LAB
ANION GAP SERPL CALCULATED.3IONS-SCNC: 10 MMOL/L (ref 7–16)
ANISOCYTOSIS: ABNORMAL
ATYPICAL LYMPHOCYTE RELATIVE PERCENT: 1.7 % (ref 0–4)
BASOPHILS ABSOLUTE: 0 E9/L (ref 0–0.2)
BASOPHILS RELATIVE PERCENT: 0.7 % (ref 0–2)
BLOOD BANK DISPENSE STATUS: NORMAL
BLOOD BANK DISPENSE STATUS: NORMAL
BLOOD BANK PRODUCT CODE: NORMAL
BLOOD BANK PRODUCT CODE: NORMAL
BPU ID: NORMAL
BPU ID: NORMAL
BUN BLDV-MCNC: 11 MG/DL (ref 6–20)
CALCIUM SERPL-MCNC: 7.6 MG/DL (ref 8.6–10.2)
CHLORIDE BLD-SCNC: 112 MMOL/L (ref 98–107)
CO2: 20 MMOL/L (ref 22–29)
CREAT SERPL-MCNC: 1 MG/DL (ref 0.5–1)
DESCRIPTION BLOOD BANK: NORMAL
DESCRIPTION BLOOD BANK: NORMAL
EOSINOPHILS ABSOLUTE: 0.14 E9/L (ref 0.05–0.5)
EOSINOPHILS RELATIVE PERCENT: 3.5 % (ref 0–6)
GFR AFRICAN AMERICAN: >60
GFR NON-AFRICAN AMERICAN: 60 ML/MIN/1.73
GLUCOSE BLD-MCNC: 85 MG/DL (ref 74–99)
HCT VFR BLD CALC: 30.7 % (ref 34–48)
HEMOGLOBIN: 9.5 G/DL (ref 11.5–15.5)
LYMPHOCYTES ABSOLUTE: 2.17 E9/L (ref 1.5–4)
LYMPHOCYTES RELATIVE PERCENT: 51.3 % (ref 20–42)
MCH RBC QN AUTO: 26.4 PG (ref 26–35)
MCHC RBC AUTO-ENTMCNC: 30.9 % (ref 32–34.5)
MCV RBC AUTO: 85.3 FL (ref 80–99.9)
MONOCYTES ABSOLUTE: 0.2 E9/L (ref 0.1–0.95)
MONOCYTES RELATIVE PERCENT: 5.2 % (ref 2–12)
MYELOCYTE PERCENT: 0.9 % (ref 0–0)
NEUTROPHILS ABSOLUTE: 1.56 E9/L (ref 1.8–7.3)
NEUTROPHILS RELATIVE PERCENT: 37.4 % (ref 43–80)
NUCLEATED RED BLOOD CELLS: 1.7 /100 WBC
OVALOCYTES: ABNORMAL
PDW BLD-RTO: 18.4 FL (ref 11.5–15)
PLATELET # BLD: 100 E9/L (ref 130–450)
PMV BLD AUTO: 9.3 FL (ref 7–12)
POIKILOCYTES: ABNORMAL
POLYCHROMASIA: ABNORMAL
POTASSIUM REFLEX MAGNESIUM: 4.7 MMOL/L (ref 3.5–5)
RBC # BLD: 3.6 E12/L (ref 3.5–5.5)
SODIUM BLD-SCNC: 142 MMOL/L (ref 132–146)
TEAR DROP CELLS: ABNORMAL
WBC # BLD: 4.1 E9/L (ref 4.5–11.5)

## 2019-09-21 PROCEDURE — 6370000000 HC RX 637 (ALT 250 FOR IP): Performed by: FAMILY MEDICINE

## 2019-09-21 PROCEDURE — 2580000003 HC RX 258: Performed by: FAMILY MEDICINE

## 2019-09-21 PROCEDURE — 2580000003 HC RX 258: Performed by: SPECIALIST

## 2019-09-21 PROCEDURE — 6360000002 HC RX W HCPCS: Performed by: SPECIALIST

## 2019-09-21 PROCEDURE — 36415 COLL VENOUS BLD VENIPUNCTURE: CPT

## 2019-09-21 PROCEDURE — 36430 TRANSFUSION BLD/BLD COMPNT: CPT

## 2019-09-21 PROCEDURE — 80048 BASIC METABOLIC PNL TOTAL CA: CPT

## 2019-09-21 PROCEDURE — 6360000002 HC RX W HCPCS: Performed by: FAMILY MEDICINE

## 2019-09-21 PROCEDURE — 85025 COMPLETE CBC W/AUTO DIFF WBC: CPT

## 2019-09-21 RX ORDER — ONDANSETRON 4 MG/1
4 TABLET, ORALLY DISINTEGRATING ORAL EVERY 8 HOURS PRN
Status: DISCONTINUED | OUTPATIENT
Start: 2019-09-21 | End: 2019-09-21 | Stop reason: HOSPADM

## 2019-09-21 RX ORDER — ONDANSETRON 4 MG/1
4 TABLET, FILM COATED ORAL EVERY 8 HOURS PRN
Qty: 10 TABLET | Refills: 0 | Status: SHIPPED | OUTPATIENT
Start: 2019-09-21

## 2019-09-21 RX ADMIN — CEFTAROLINE FOSAMIL 600 MG: 600 POWDER, FOR SOLUTION INTRAVENOUS at 11:22

## 2019-09-21 RX ADMIN — HEPARIN 300 UNITS: 100 SYRINGE at 15:41

## 2019-09-21 RX ADMIN — ACETAMINOPHEN 650 MG: 325 TABLET, FILM COATED ORAL at 12:31

## 2019-09-21 RX ADMIN — Medication 10 ML: at 09:22

## 2019-09-21 RX ADMIN — PREGABALIN 150 MG: 75 CAPSULE ORAL at 09:21

## 2019-09-21 RX ADMIN — ACYCLOVIR 400 MG: 800 TABLET ORAL at 09:21

## 2019-09-21 RX ADMIN — ENOXAPARIN SODIUM 40 MG: 40 INJECTION SUBCUTANEOUS at 09:21

## 2019-09-21 RX ADMIN — ONDANSETRON 4 MG: 4 TABLET, ORALLY DISINTEGRATING ORAL at 12:07

## 2019-09-21 ASSESSMENT — PAIN DESCRIPTION - LOCATION: LOCATION: HEAD

## 2019-09-21 ASSESSMENT — PAIN SCALES - GENERAL: PAINLEVEL_OUTOF10: 4

## 2019-09-21 ASSESSMENT — PAIN DESCRIPTION - PAIN TYPE: TYPE: ACUTE PAIN

## 2019-09-21 ASSESSMENT — PAIN DESCRIPTION - PROGRESSION: CLINICAL_PROGRESSION: GRADUALLY IMPROVING

## 2019-09-21 ASSESSMENT — PAIN DESCRIPTION - DESCRIPTORS: DESCRIPTORS: HEADACHE;DISCOMFORT;DULL

## 2019-09-21 NOTE — DISCHARGE SUMMARY
Hospitalist Discharge Summary    Patient ID: Ector Byers   Patient : 1975  Patient's PCP: Janina Tejeda DO    Admit Date: 2019   Admitting Physician: Josselyn Palomo MD    Discharge Date:  2019  Discharge Physician: Indra Aguirre MD   Discharge Condition: Stable  Discharge Disposition: Home    History of presenting illness:    53 y.o. female who presented to [address] [place] [address] with fever. PMH of multiple lyeloma, asthma, anemia. Patient reports fever of 103.3 at home last night. She took ibuprofen at home. She is actively undergoing chemotherapy. seh started taking Kypolis a few  weeks ago. She also complains of SOB. Denies any chest pain, nausea, abdominal pain or dizziness. Vitals on arrival 122.73, HR 93, temp 100.8, 100% RA. Labs Bun/Cr 16/1.3, WBC 1.1, H/H 7.9/26.8. Trop negative. D-dimer negative. procalcitonin 16.66. CT chest negative. She was treated with 1 liter NS, tylenol 650 mg, cefepime and vanco.       She has just been switched back to chemotherapy that includes  KYPROLIS.  Patient claims that she had a similar problem when she was initiated on her previous chemo.  She had been on that chemo for 2 years until about a week or so ago when they decided to switch her over to Kyprolix.  Yesterday's dose was doubled the original dose a week before and she developed fevers chills and rigors similar to when she had her initiation from previous chemo.  Patient has been treated for multiple myeloma which has still been come somewhat refractory in spite of the bone marrow transplant.  She also had a recent renal lesion that was biopsied at the [facility] clinic.    Patient states she has not had fever prior to yesterday. She denies any chest pain. She does complain of some shortness of breath which is her normal due to her chemotherapy. Nausea, vomiting or diarrhea. She denies any dysuria.     Hospital course in brief:  (Please refer to daily progress notes for a

## 2019-09-21 NOTE — PROGRESS NOTES
[address] [place] [address] [facility]  [facility]  Progress Note    SUBJECTIVE:  Chief Complaint   Patient presents with    Fever     started new IV chemo today. Fever 103 at home took motrin, sob, tachy     The patient is feeling well. No new complaints today. No nausea, vomiting or diarrhea. No pain. Review of systems:  As stated above in the chief complaint, otherwise negative. Medications:  Scheduled Meds:   sodium chloride  250 mL Intravenous Once    acyclovir  400 mg Oral BID    enoxaparin  40 mg Subcutaneous Daily    pregabalin  150 mg Oral Daily    sodium chloride flush  10 mL Intravenous 2 times per day    sodium chloride  250 mL Intravenous Once    heparin flush  300 Units Intracatheter Q12H    Tbo-Filgrastim  300 mcg Subcutaneous QPM     Continuous Infusions:    PRN Meds:ondansetron, oxyCODONE HCl, zolpidem, sodium chloride flush, acetaminophen    OBJECTIVE:  /76   Pulse 72   Temp 98.5 °F (36.9 °C) (Oral)   Resp 16   Ht 5' 7\" (1.702 m)   Wt 273 lb 14.4 oz (124.2 kg)   SpO2 97%   BMI 42.90 kg/m²   Temp  Av.1 °F (36.7 °C)  Min: 97.3 °F (36.3 °C)  Max: 98.5 °F (36.9 °C)  Constitutional: Patient is sitting in bed. She is awake, alert and in no distress. Skin: No rash. HEENT: Round pupils. No thrush. Neck: Supple to movements. Chest: Good breath sounds. No crackles. Cardiovascular: Heart sounds rhythmic and regular. Abdomen: Round, large and soft to palpation. Back: No CVA tenderness. Extremities: No edema.   Lines: peripheral    Laboratory and Tests Review:  Lab Results   Component Value Date    WBC 4.1 (L) 2019    WBC 2.1 (L) 2019    WBC 1.4 (L) 2019    HGB 9.5 (L) 2019    HCT 30.7 (L) 2019    MCV 85.3 2019     (L) 2019     Lab Results   Component Value Date    NEUTROABS 1.56 (L) 2019    NEUTROABS 0.74 (L) 2019    NEUTROABS 0.63 (L) 2019     No results found for: [facility]  Lab Results testing  is not routinely indicated and will not be performed. Mixed alannah isolated includes:  Mixed gram positive organisms  Staph species  Strep species     03/02/2019 <10,000 CFU/mL  Mixed gram positive organisms    Final   02/18/2018 Growth not present  Final     No results found for: [facility]    ASSESSMENT:  · Neutropenic fever associated with a recent monoclonal antibody infusion  · Probably tissue necrosis factor/systemic inflammatory response syndrome  · Bacteriuria with mixed alannah    PLAN:  · Received 2 doses of Cefepime, followed by Ceftaroline.   Currently off antibiotics  · Continue follow off antibiotics  · If the patient is doing well and WBC continues to improve she can be discharged from [facility]  1:34 PM  9/21/2019

## 2019-09-22 RX ORDER — AMOXICILLIN AND CLAVULANATE POTASSIUM 875; 125 MG/1; MG/1
1 TABLET, FILM COATED ORAL EVERY 12 HOURS SCHEDULED
Qty: 20 TABLET | Refills: 0 | Status: SHIPPED | OUTPATIENT
Start: 2019-09-22 | End: 2019-10-02

## 2019-09-22 RX ORDER — AMOXICILLIN AND CLAVULANATE POTASSIUM 875; 125 MG/1; MG/1
1 TABLET, FILM COATED ORAL EVERY 12 HOURS SCHEDULED
Status: ACTIVE | OUTPATIENT
Start: 2019-09-22

## 2019-09-23 LAB
CULTURE, BLOOD 2: NORMAL
IGA: 6 MG/DL (ref 70–400)
IGG: 415 MG/DL (ref 700–1600)
IGM: <5 MG/DL (ref 40–230)

## 2019-09-24 LAB — CULTURE, BLOOD 3: NORMAL

## 2019-09-25 LAB
BLOOD CULTURE, ROUTINE: ABNORMAL
ORGANISM: ABNORMAL

## 2019-09-30 LAB — BLOOD BANK REFERENCE LAB TEST: NORMAL

## 2019-10-30 ENCOUNTER — HOSPITAL ENCOUNTER (OUTPATIENT)
Age: 44
Discharge: HOME | End: 2019-10-30
Payer: COMMERCIAL

## 2019-10-30 LAB
ABO/RH: NORMAL
ANTIBODY IDENTIFICATION: NORMAL
ANTIBODY SCREEN: NORMAL
DAT POLYSPECIFIC: NORMAL

## 2019-10-30 PROCEDURE — 86870 RBC ANTIBODY IDENTIFICATION: CPT

## 2019-10-30 PROCEDURE — 86880 COOMBS TEST DIRECT: CPT

## 2019-10-30 PROCEDURE — 86922 COMPATIBILITY TEST ANTIGLOB: CPT

## 2019-10-30 PROCEDURE — 36415 COLL VENOUS BLD VENIPUNCTURE: CPT

## 2019-10-30 PROCEDURE — 86850 RBC ANTIBODY SCREEN: CPT

## 2019-10-30 PROCEDURE — 86900 BLOOD TYPING SEROLOGIC ABO: CPT

## 2019-10-30 PROCEDURE — 86901 BLOOD TYPING SEROLOGIC RH(D): CPT

## 2019-10-31 RX ORDER — SODIUM CHLORIDE 9 MG/ML
INJECTION, SOLUTION INTRAVENOUS CONTINUOUS
Status: CANCELLED | OUTPATIENT
Start: 2019-10-31

## 2019-10-31 RX ORDER — DIPHENHYDRAMINE HYDROCHLORIDE 50 MG/ML
50 INJECTION INTRAMUSCULAR; INTRAVENOUS ONCE
Status: CANCELLED | OUTPATIENT
Start: 2019-10-31

## 2019-10-31 RX ORDER — SODIUM CHLORIDE 0.9 % (FLUSH) 0.9 %
20 SYRINGE (ML) INJECTION PRN
Status: CANCELLED | OUTPATIENT
Start: 2019-10-31

## 2019-10-31 RX ORDER — DIPHENHYDRAMINE HYDROCHLORIDE 50 MG/ML
50 INJECTION INTRAMUSCULAR; INTRAVENOUS ONCE
Status: CANCELLED | OUTPATIENT
Start: 2019-11-01

## 2019-10-31 RX ORDER — 0.9 % SODIUM CHLORIDE 0.9 %
250 INTRAVENOUS SOLUTION INTRAVENOUS ONCE
Status: CANCELLED | OUTPATIENT
Start: 2019-10-31

## 2019-10-31 RX ORDER — METHYLPREDNISOLONE SODIUM SUCCINATE 125 MG/2ML
125 INJECTION, POWDER, LYOPHILIZED, FOR SOLUTION INTRAMUSCULAR; INTRAVENOUS ONCE
Status: CANCELLED | OUTPATIENT
Start: 2019-10-31

## 2019-10-31 RX ORDER — SODIUM CHLORIDE 0.9 % (FLUSH) 0.9 %
20 SYRINGE (ML) INJECTION PRN
Status: CANCELLED | OUTPATIENT
Start: 2019-11-01

## 2019-10-31 RX ORDER — METHYLPREDNISOLONE SODIUM SUCCINATE 125 MG/2ML
125 INJECTION, POWDER, LYOPHILIZED, FOR SOLUTION INTRAMUSCULAR; INTRAVENOUS ONCE
Status: CANCELLED | OUTPATIENT
Start: 2019-11-01

## 2019-10-31 RX ORDER — SODIUM CHLORIDE 9 MG/ML
INJECTION, SOLUTION INTRAVENOUS CONTINUOUS
Status: CANCELLED | OUTPATIENT
Start: 2019-11-01

## 2019-11-01 ENCOUNTER — HOSPITAL ENCOUNTER (OUTPATIENT)
Dept: INFUSION THERAPY | Age: 44
Discharge: HOME | End: 2019-11-01
Payer: COMMERCIAL

## 2019-11-01 VITALS
SYSTOLIC BLOOD PRESSURE: 152 MMHG | RESPIRATION RATE: 16 BRPM | DIASTOLIC BLOOD PRESSURE: 72 MMHG | HEART RATE: 60 BPM | TEMPERATURE: 98 F

## 2019-11-01 DIAGNOSIS — C90.00 MULTIPLE MYELOMA NOT HAVING ACHIEVED REMISSION (HCC): Primary | ICD-10-CM

## 2019-11-01 LAB
BLOOD BANK DISPENSE STATUS: NORMAL
BLOOD BANK DISPENSE STATUS: NORMAL
BLOOD BANK PRODUCT CODE: NORMAL
BLOOD BANK PRODUCT CODE: NORMAL
BPU ID: NORMAL
BPU ID: NORMAL
DESCRIPTION BLOOD BANK: NORMAL
DESCRIPTION BLOOD BANK: NORMAL

## 2019-11-01 PROCEDURE — 2580000003 HC RX 258

## 2019-11-01 PROCEDURE — P9040 RBC LEUKOREDUCED IRRADIATED: HCPCS

## 2019-11-01 PROCEDURE — 36430 TRANSFUSION BLD/BLD COMPNT: CPT

## 2019-11-01 RX ORDER — SODIUM CHLORIDE 9 MG/ML
INJECTION, SOLUTION INTRAVENOUS
Status: COMPLETED
Start: 2019-11-01 | End: 2019-11-01

## 2019-11-01 RX ORDER — 0.9 % SODIUM CHLORIDE 0.9 %
250 INTRAVENOUS SOLUTION INTRAVENOUS ONCE
Status: COMPLETED | OUTPATIENT
Start: 2019-11-01 | End: 2019-11-01

## 2019-11-01 RX ADMIN — SODIUM CHLORIDE 250 ML: 9 INJECTION, SOLUTION INTRAVENOUS at 08:25

## 2019-11-01 RX ADMIN — Medication 250 ML: at 08:25

## 2019-11-01 NOTE — PROGRESS NOTES
Pt presents to clinic for 2 units of PRBC. Pt tolerated treatment well without complications. Pt was discharged ambulatory in stable condition.

## 2019-12-16 ENCOUNTER — HOSPITAL ENCOUNTER (OUTPATIENT)
Dept: GENERAL RADIOLOGY | Age: 44
Discharge: HOME | End: 2019-12-18
Payer: COMMERCIAL

## 2019-12-16 ENCOUNTER — HOSPITAL ENCOUNTER (OUTPATIENT)
Age: 44
Discharge: HOME | End: 2019-12-18
Payer: COMMERCIAL

## 2019-12-16 DIAGNOSIS — R05.9 COUGH: ICD-10-CM

## 2019-12-16 PROCEDURE — 71046 X-RAY EXAM CHEST 2 VIEWS: CPT

## 2020-01-03 ENCOUNTER — HOSPITAL ENCOUNTER (OUTPATIENT)
Dept: GENERAL RADIOLOGY | Age: 45
Discharge: HOME | End: 2020-01-05
Payer: COMMERCIAL

## 2020-01-03 ENCOUNTER — HOSPITAL ENCOUNTER (OUTPATIENT)
Age: 45
Discharge: HOME | End: 2020-01-05
Payer: COMMERCIAL

## 2020-01-03 PROCEDURE — 71046 X-RAY EXAM CHEST 2 VIEWS: CPT

## 2020-01-16 ENCOUNTER — HOSPITAL ENCOUNTER (OUTPATIENT)
Age: 45
Discharge: HOME | End: 2020-01-16
Payer: COMMERCIAL

## 2020-01-16 LAB
ABO/RH: NORMAL
ANTIBODY SCREEN: NORMAL

## 2020-01-16 PROCEDURE — 86900 BLOOD TYPING SEROLOGIC ABO: CPT

## 2020-01-16 PROCEDURE — 86923 COMPATIBILITY TEST ELECTRIC: CPT

## 2020-01-16 PROCEDURE — 86901 BLOOD TYPING SEROLOGIC RH(D): CPT

## 2020-01-16 PROCEDURE — 86922 COMPATIBILITY TEST ANTIGLOB: CPT

## 2020-01-16 PROCEDURE — P9040 RBC LEUKOREDUCED IRRADIATED: HCPCS

## 2020-01-16 PROCEDURE — 36415 COLL VENOUS BLD VENIPUNCTURE: CPT

## 2020-01-16 PROCEDURE — 86850 RBC ANTIBODY SCREEN: CPT

## 2020-01-16 RX ORDER — SODIUM CHLORIDE 0.9 % (FLUSH) 0.9 %
10 SYRINGE (ML) INJECTION PRN
Status: CANCELLED | OUTPATIENT
Start: 2020-01-17

## 2020-01-16 RX ORDER — SODIUM CHLORIDE 9 MG/ML
INJECTION, SOLUTION INTRAVENOUS CONTINUOUS
Status: CANCELLED | OUTPATIENT
Start: 2020-01-16

## 2020-01-16 RX ORDER — DIPHENHYDRAMINE HYDROCHLORIDE 50 MG/ML
50 INJECTION INTRAMUSCULAR; INTRAVENOUS ONCE
Status: CANCELLED | OUTPATIENT
Start: 2020-01-17

## 2020-01-16 RX ORDER — DIPHENHYDRAMINE HYDROCHLORIDE 50 MG/ML
50 INJECTION INTRAMUSCULAR; INTRAVENOUS ONCE
Status: CANCELLED | OUTPATIENT
Start: 2020-01-16

## 2020-01-16 RX ORDER — EPINEPHRINE 1 MG/ML
0.3 INJECTION, SOLUTION, CONCENTRATE INTRAVENOUS PRN
Status: CANCELLED | OUTPATIENT
Start: 2020-01-16

## 2020-01-16 RX ORDER — EPINEPHRINE 1 MG/ML
0.3 INJECTION, SOLUTION, CONCENTRATE INTRAVENOUS PRN
Status: CANCELLED | OUTPATIENT
Start: 2020-01-17

## 2020-01-16 RX ORDER — METHYLPREDNISOLONE SODIUM SUCCINATE 125 MG/2ML
125 INJECTION, POWDER, LYOPHILIZED, FOR SOLUTION INTRAMUSCULAR; INTRAVENOUS ONCE
Status: CANCELLED | OUTPATIENT
Start: 2020-01-16

## 2020-01-16 RX ORDER — SODIUM CHLORIDE 9 MG/ML
INJECTION, SOLUTION INTRAVENOUS CONTINUOUS
Status: CANCELLED | OUTPATIENT
Start: 2020-01-17

## 2020-01-16 RX ORDER — METHYLPREDNISOLONE SODIUM SUCCINATE 125 MG/2ML
125 INJECTION, POWDER, LYOPHILIZED, FOR SOLUTION INTRAMUSCULAR; INTRAVENOUS ONCE
Status: CANCELLED | OUTPATIENT
Start: 2020-01-17

## 2020-01-17 ENCOUNTER — HOSPITAL ENCOUNTER (OUTPATIENT)
Dept: INFUSION THERAPY | Age: 45
Discharge: HOME | End: 2020-01-17
Payer: COMMERCIAL

## 2020-01-17 VITALS
RESPIRATION RATE: 16 BRPM | SYSTOLIC BLOOD PRESSURE: 117 MMHG | DIASTOLIC BLOOD PRESSURE: 70 MMHG | OXYGEN SATURATION: 100 % | HEART RATE: 66 BPM | TEMPERATURE: 97.3 F

## 2020-01-17 DIAGNOSIS — C90.00 MULTIPLE MYELOMA NOT HAVING ACHIEVED REMISSION (HCC): Primary | ICD-10-CM

## 2020-01-17 LAB
BLOOD BANK DISPENSE STATUS: NORMAL
BLOOD BANK PRODUCT CODE: NORMAL
BPU ID: NORMAL
DESCRIPTION BLOOD BANK: NORMAL

## 2020-01-17 PROCEDURE — 2580000003 HC RX 258

## 2020-01-17 PROCEDURE — P9040 RBC LEUKOREDUCED IRRADIATED: HCPCS

## 2020-01-17 PROCEDURE — 36430 TRANSFUSION BLD/BLD COMPNT: CPT

## 2020-01-17 RX ORDER — SODIUM CHLORIDE 0.9 % (FLUSH) 0.9 %
10 SYRINGE (ML) INJECTION PRN
Status: CANCELLED | OUTPATIENT
Start: 2020-01-17

## 2020-01-17 RX ORDER — EPINEPHRINE 1 MG/ML
0.3 INJECTION, SOLUTION, CONCENTRATE INTRAVENOUS PRN
Status: CANCELLED | OUTPATIENT
Start: 2020-01-17

## 2020-01-17 RX ORDER — SODIUM CHLORIDE 9 MG/ML
INJECTION, SOLUTION INTRAVENOUS CONTINUOUS
Status: CANCELLED | OUTPATIENT
Start: 2020-01-17

## 2020-01-17 RX ORDER — METHYLPREDNISOLONE SODIUM SUCCINATE 125 MG/2ML
125 INJECTION, POWDER, LYOPHILIZED, FOR SOLUTION INTRAMUSCULAR; INTRAVENOUS ONCE
Status: CANCELLED | OUTPATIENT
Start: 2020-01-17

## 2020-01-17 RX ORDER — SODIUM CHLORIDE 0.9 % (FLUSH) 0.9 %
10 SYRINGE (ML) INJECTION PRN
Status: DISCONTINUED | OUTPATIENT
Start: 2020-01-17 | End: 2020-01-18 | Stop reason: HOSPADM

## 2020-01-17 RX ORDER — SODIUM CHLORIDE 9 MG/ML
INJECTION, SOLUTION INTRAVENOUS
Status: COMPLETED
Start: 2020-01-17 | End: 2020-01-17

## 2020-01-17 RX ORDER — DIPHENHYDRAMINE HYDROCHLORIDE 50 MG/ML
50 INJECTION INTRAMUSCULAR; INTRAVENOUS ONCE
Status: CANCELLED | OUTPATIENT
Start: 2020-01-17

## 2020-01-17 RX ADMIN — SODIUM CHLORIDE: 9 INJECTION, SOLUTION INTRAVENOUS at 09:00

## 2020-01-17 NOTE — PROGRESS NOTES
Patient received 2 unit(s) of PRBC's. Transfusion was tolerated well and the patient was observed for 30 minutes prior to discharge.

## 2021-12-03 PROBLEM — Z87.898 HISTORY OF DIFFICULT VENOUS ACCESS: Status: ACTIVE | Noted: 2021-12-03

## 2021-12-06 NOTE — PROGRESS NOTES
Patient states she had both her second vaccine and booster dose. I instructed her to bring her card on her procedure day.

## 2021-12-13 NOTE — PROGRESS NOTES
[facility] PRE-ADMISSION TESTING GENERAL INSTRUCTIONS- [facility]-phone number:[phone]    GENERAL INSTRUCTIONS  [x] Antibacterial Soap shower Night before and/or AM of Surgery  [] Gonzalo wipe instruction sheet and wipes given. [x] Nothing by mouth after midnight, including gum, candy, mints, or water. [x] You may brush your teeth, gargle, but do NOT swallow water. []Hibiclens shower  the night before and the morning of surgery. Do not use             Hibiclens on your face or head. [x]No smoking, chewing tobacco, illegal drugs, or alcohol within 24 hours of your surgery. [x] Jewelry, valuables or body piercing's should not be brought to the hospital. All body and/or tongue piercing's must be removed prior to arriving to hospital.  ALL hair pins must be removed. [x] Do not wear makeup, lotions, powders, deodorant. Nail polish as directed by the nurse. [x] Arrange transportation with a responsible adult  to and from the hospital. If you do not have a responsible adult  to transport you, you will need to make arrangements with a medical transportation company (i.e. [email or web address]. A Uber/taxi/bus is not appropriate unless you are accompanied by a responsible adult ). Arrange for someone to be with you for the remainder of the day and for 24 hours after your procedure due to having had anesthesia. Who will be your  for transportation?____son___   Who will be staying with you for 24 hrs after your procedure?__________________  [x] Bring insurance card and photo ID.  [] Transfusion Bracelet: Please bring with you to hospital, day of surgery  [] Bring urine specimen day of surgery. Any small container is acceptable. [] Use inhalers the morning of surgery and bring with you to hospital.  [] Bring copy of living will or healthcare power of  papers to be placed in your electronic record.   [] CPAP/BI-PAP: Please bring your machine if you are to spend the night in the hospital.     PARKING INSTRUCTIONS:   [x] Arrival Time:____0530____  · [x] Parking lot '\"I\"  is located on [facility] (the corner of [facility] and [facility]). To enter, press the button and the gate will lift. A free token will be provided to exit the lot. One car per patient is allowed to park in this lot. All other cars are to park on [address] [place] [address] Street either in the parking garage or the handicap lot. [] To reach the [facility] lobby from [address] [place] [address], upon entering the hospital, take elevator B to the 3rd floor. EDUCATION INSTRUCTIONS:      [] Knee or hip replacement booklet & exercise pamphlets given. [] Laura De La Rosa placed in chart. [] Pre-admission Testing educational folder given  [] Incentive Spirometry,coughing & deep breathing exercises reviewed. []Medication information sheet(s)   []Fluoroscopy-Xray used in surgery reviewed with patient. Educational pamphlet placed in chart. []Pain: Post-op pain is normal and to be expected. You will be asked to rate your pain from 0-10(a zero is not acceptable-education is needed). Your post-op pain goal is:  [] Ask your nurse for your pain medication. [] Joint camp offered. [] Joint replacement booklets given. [] Other:___________________________    MEDICATION INSTRUCTIONS:   [x]Bring a complete list of your medications, please write the last time you took the medicine, give this list to the nurse. [x] Take the following medications the morning of surgery with 1-2 ounces of water:  Lyrica, oxycodone if needed, albuterol if needed   [x] Stop herbal supplements and vitamins 5 days before your surgery. [] DO NOT take any diabetic medicine the morning of surgery. Follow instructions for insulin the day before surgery. [] If you are diabetic and your blood sugar is low or you feel symptomatic, you may drink 1-2 ounces of apple juice or take a glucose tablet.   The morning of your procedure, you may call the pre-op area if you have concerns about your blood sugar [phone]. [] Use your inhalers the morning of surgery. Bring your emergency inhaler with you day of surgery. [x] Follow physician instructions regarding any blood thinners you may be taking. WHAT TO EXPECT:  [x] The day of surgery you will be greeted and checked in by the [employer].  In addition, you will be registered in the [place] by a Patient Access Representative. Please bring your photo ID and insurance card. A nurse will greet you in accordance to the time you are needed in the pre-op area to prepare you for surgery. Please do not be discouraged if you are not greeted in the order you arrive as there are many variables that are involved in patient preparation. Your patience is greatly appreciated as you wait for your nurse. Please bring in items such as: books, magazines, newspapers, electronics, or any other items  to occupy your time in the waiting area. [x]  Delays may occur with surgery and staff will make a sincere effort to keep you informed of delays. If any delays occur with your procedure, we apologize ahead of time for your inconvenience as we recognize the value of your time.

## 2021-12-14 ENCOUNTER — ANESTHESIA EVENT (OUTPATIENT)
Dept: OPERATING ROOM | Age: 46
End: 2021-12-14
Payer: COMMERCIAL

## 2021-12-14 ENCOUNTER — OFFICE VISIT (OUTPATIENT)
Dept: VASCULAR SURGERY | Age: 46
End: 2021-12-14
Payer: COMMERCIAL

## 2021-12-14 VITALS — WEIGHT: 292 LBS | HEIGHT: 67 IN | BODY MASS INDEX: 45.83 KG/M2

## 2021-12-14 DIAGNOSIS — C90.00 MULTIPLE MYELOMA NOT HAVING ACHIEVED REMISSION (HCC): Primary | Chronic | ICD-10-CM

## 2021-12-14 PROCEDURE — 1036F TOBACCO NON-USER: CPT | Performed by: SURGERY

## 2021-12-14 PROCEDURE — G8427 DOCREV CUR MEDS BY ELIG CLIN: HCPCS | Performed by: SURGERY

## 2021-12-14 PROCEDURE — G8484 FLU IMMUNIZE NO ADMIN: HCPCS | Performed by: SURGERY

## 2021-12-14 PROCEDURE — 99203 OFFICE O/P NEW LOW 30 MIN: CPT | Performed by: SURGERY

## 2021-12-14 PROCEDURE — G8417 CALC BMI ABV UP PARAM F/U: HCPCS | Performed by: SURGERY

## 2021-12-14 NOTE — PROGRESS NOTES
108 (90 Base) MCG/ACT inhaler Inhale 1 puff into the lungs every 6 hours as needed for Wheezing 18  Yes WU Glaser CNP   loratadine (CLARITIN) 10 MG tablet Take 10 mg by mouth as needed    Yes Historical Provider, MD   pregabalin (LYRICA) 150 MG capsule TAKE 1 CAPSULE BY MOUTH TWICE DAILY 3/17/17  Yes Historical Provider, MD   oxyCODONE HCl (OXY-IR) 10 MG immediate release tablet Take 10 mg by mouth every 4 hours as needed. 3/13/17  Yes Historical Provider, MD   zolpidem (AMBIEN) 10 MG tablet Take 10 mg by mouth 17  Yes Historical Provider, MD   Probiotic Product (PROBIOTIC DAILY) CAPS Take 1 tablet by mouth 2 times daily  3/9/17  Yes Historical Provider, MD   enoxaparin (LOVENOX) 40 MG/0.4ML injection  3/11/15  Yes Historical Provider, MD   acyclovir (ZOVIRAX) 400 MG tablet Take 400 mg by mouth 2 times daily  14  Yes Historical Provider, MD     Allergies:  Patient has no known allergies.     Social History     Socioeconomic History    Marital status: Single     Spouse name: Not on file    Number of children: Not on file    Years of education: Not on file    Highest education level: Not on file   Occupational History    Occupation: SSI   Tobacco Use    Smoking status: Former Smoker     Packs/day: 0.25     Years: 20.00     Pack years: 5.00     Start date:      Quit date: 2021     Years since quittin.7    Smokeless tobacco: Never Used   Vaping Use    Vaping Use: Never used   Substance and Sexual Activity    Alcohol use: No    Drug use: No    Sexual activity: Not Currently   Other Topics Concern    Not on file   Social History Narrative    Not on file     Social Determinants of Health     Financial Resource Strain:     Difficulty of Paying Living Expenses: Not on file   Food Insecurity:     Worried About [address] in the Last Year: Not on file    [address] in the Last Year: Not on file   Transportation Needs:     Lack of Transportation (Medical): Not on file    Lack of Transportation (Non-Medical):  Not on file   Physical Activity:     Days of Exercise per Week: Not on file    Minutes of Exercise per Session: Not on file   Stress:     Feeling of Stress : Not on file   Social Connections:     Frequency of Communication with Friends and Family: Not on file    Frequency of Social Gatherings with Friends and Family: Not on file    Attends Taoism Services: Not on file    Active Member of [address] [place] [address] or Organizations: Not on file    Attends Club or Organization Meetings: Not on file    Marital Status: Not on file   Intimate Partner Violence:     Fear of Current or Ex-Partner: Not on file    Emotionally Abused: Not on file    Physically Abused: Not on file    Sexually Abused: Not on file   Housing Stability:     Unable to Pay for Housing in the Last Year: Not on file    Number of [place] in the Last Year: Not on file    Unstable Housing in the Last Year: Not on file        Family History   Problem Relation Age of Onset    High Blood Pressure Mother     Cancer Father        REVIEW OF SYSTEMS (New symptoms):    Eyes:      Blurred vision:  No [x]/Yes []               Diplopia:   No [x]/Yes []               Vision loss:       No [x]/Yes []   Ears, nose, throat:             Hearing loss:    No [x]/Yes []      Vertigo:   No [x]/Yes []                       Swallowing problem:  No [x]/Yes []               Nose bleeds:   No [x]/Yes []      Voice hoarseness:  No [x]/Yes []  Respiratory:             Cough:   No [x]/Yes []      Pleuritic chest pain:  No [x]/Yes []                        Dyspnea:   No [x]/Yes []      Wheezing:   No [x]/Yes []  Cardiovascular:             Angina:   No [x]/Yes []      Palpitations:   No [x]/Yes []          Claudication:    No [x]/Yes []      Leg swelling:   No [x]/Yes []  Gastrointestinal:             Nausea or vomiting:  No [x]/Yes []               Abdominal pain:  No [x]/Yes []                     Intestinal bleeding: No List Items Addressed This Visit     Multiple myeloma ([facility] [address] - Primary (Chronic)          I also discussed with her we will be able to insert a new port on the right. I reviewed the procedure with the patient. I discussed the risks, benefits, and alternatives of the procedure. The patient understands and consents. All questions were answered. We will schedule her for removal of port and insertion of port. Pt seen and plan reviewed with Dr. Wong Emmanuel. WU Peralta - NP     No follow-ups on file. Agree. We will plan to remove the existing port and insert a new system. I reviewed the procedure with the patient. I discussed the risks, benefits, and alternatives of the procedure. The patient understands and consents. All questions were answered.

## 2021-12-15 ENCOUNTER — ANESTHESIA (OUTPATIENT)
Dept: OPERATING ROOM | Age: 46
End: 2021-12-15
Payer: COMMERCIAL

## 2021-12-15 ENCOUNTER — APPOINTMENT (OUTPATIENT)
Dept: GENERAL RADIOLOGY | Age: 46
End: 2021-12-15
Attending: SURGERY
Payer: COMMERCIAL

## 2021-12-15 ENCOUNTER — HOSPITAL ENCOUNTER (OUTPATIENT)
Age: 46
Setting detail: OUTPATIENT SURGERY
Discharge: HOME | End: 2021-12-15
Attending: SURGERY | Admitting: SURGERY
Payer: COMMERCIAL

## 2021-12-15 VITALS
SYSTOLIC BLOOD PRESSURE: 151 MMHG | HEART RATE: 79 BPM | HEIGHT: 67 IN | WEIGHT: 290 LBS | TEMPERATURE: 97.1 F | OXYGEN SATURATION: 97 % | BODY MASS INDEX: 45.52 KG/M2 | DIASTOLIC BLOOD PRESSURE: 92 MMHG | RESPIRATION RATE: 25 BRPM

## 2021-12-15 VITALS — DIASTOLIC BLOOD PRESSURE: 83 MMHG | SYSTOLIC BLOOD PRESSURE: 127 MMHG | OXYGEN SATURATION: 100 %

## 2021-12-15 DIAGNOSIS — Z45.2 ENCOUNTER FOR INSERTION OF VENOUS ACCESS PORT: ICD-10-CM

## 2021-12-15 LAB
ANION GAP SERPL CALCULATED.3IONS-SCNC: 11 MMOL/L (ref 7–16)
APTT: 30.7 SEC (ref 24.5–35.1)
BUN BLDV-MCNC: 9 MG/DL (ref 6–20)
CALCIUM SERPL-MCNC: 9 MG/DL (ref 8.6–10.2)
CHLORIDE BLD-SCNC: 107 MMOL/L (ref 98–107)
CO2: 24 MMOL/L (ref 22–29)
CREAT SERPL-MCNC: 0.7 MG/DL (ref 0.5–1)
GFR AFRICAN AMERICAN: >60
GFR NON-AFRICAN AMERICAN: >60 ML/MIN/1.73
GLUCOSE BLD-MCNC: 162 MG/DL (ref 74–99)
HCT VFR BLD CALC: 32.1 % (ref 34–48)
HEMOGLOBIN: 9.5 G/DL (ref 11.5–15.5)
INR BLD: 1
MCH RBC QN AUTO: 25.1 PG (ref 26–35)
MCHC RBC AUTO-ENTMCNC: 29.6 % (ref 32–34.5)
MCV RBC AUTO: 84.9 FL (ref 80–99.9)
PDW BLD-RTO: 19.6 FL (ref 11.5–15)
PLATELET # BLD: 153 E9/L (ref 130–450)
PMV BLD AUTO: 10.2 FL (ref 7–12)
POTASSIUM REFLEX MAGNESIUM: 3.7 MMOL/L (ref 3.5–5)
PROTHROMBIN TIME: 10.8 SEC (ref 9.3–12.4)
RBC # BLD: 3.78 E12/L (ref 3.5–5.5)
SODIUM BLD-SCNC: 142 MMOL/L (ref 132–146)
WBC # BLD: 5.3 E9/L (ref 4.5–11.5)

## 2021-12-15 PROCEDURE — 36590 REMOVAL TUNNELED CV CATH: CPT | Performed by: SURGERY

## 2021-12-15 PROCEDURE — 2580000003 HC RX 258: Performed by: NURSE PRACTITIONER

## 2021-12-15 PROCEDURE — 6360000002 HC RX W HCPCS

## 2021-12-15 PROCEDURE — 77001 FLUOROGUIDE FOR VEIN DEVICE: CPT | Performed by: SURGERY

## 2021-12-15 PROCEDURE — 6360000002 HC RX W HCPCS: Performed by: SURGERY

## 2021-12-15 PROCEDURE — 7100000010 HC PHASE II RECOVERY - FIRST 15 MIN: Performed by: SURGERY

## 2021-12-15 PROCEDURE — 3600000013 HC SURGERY LEVEL 3 ADDTL 15MIN: Performed by: SURGERY

## 2021-12-15 PROCEDURE — 6360000002 HC RX W HCPCS: Performed by: NURSE PRACTITIONER

## 2021-12-15 PROCEDURE — 2580000003 HC RX 258: Performed by: SURGERY

## 2021-12-15 PROCEDURE — 36415 COLL VENOUS BLD VENIPUNCTURE: CPT

## 2021-12-15 PROCEDURE — 3700000001 HC ADD 15 MINUTES (ANESTHESIA): Performed by: SURGERY

## 2021-12-15 PROCEDURE — 2500000003 HC RX 250 WO HCPCS: Performed by: SURGERY

## 2021-12-15 PROCEDURE — 3600000003 HC SURGERY LEVEL 3 BASE: Performed by: SURGERY

## 2021-12-15 PROCEDURE — 85610 PROTHROMBIN TIME: CPT

## 2021-12-15 PROCEDURE — 3209999900 FLUORO FOR SURGICAL PROCEDURES

## 2021-12-15 PROCEDURE — 6370000000 HC RX 637 (ALT 250 FOR IP): Performed by: ANESTHESIOLOGY

## 2021-12-15 PROCEDURE — 2500000003 HC RX 250 WO HCPCS

## 2021-12-15 PROCEDURE — 85730 THROMBOPLASTIN TIME PARTIAL: CPT

## 2021-12-15 PROCEDURE — 36561 INSERT TUNNELED CV CATH: CPT | Performed by: SURGERY

## 2021-12-15 PROCEDURE — 80048 BASIC METABOLIC PNL TOTAL CA: CPT

## 2021-12-15 PROCEDURE — 71045 X-RAY EXAM CHEST 1 VIEW: CPT

## 2021-12-15 PROCEDURE — 2580000003 HC RX 258

## 2021-12-15 PROCEDURE — C1894 INTRO/SHEATH, NON-LASER: HCPCS | Performed by: SURGERY

## 2021-12-15 PROCEDURE — 85027 COMPLETE CBC AUTOMATED: CPT

## 2021-12-15 PROCEDURE — C1788 PORT, INDWELLING, IMP: HCPCS | Performed by: SURGERY

## 2021-12-15 PROCEDURE — 2709999900 HC NON-CHARGEABLE SUPPLY: Performed by: SURGERY

## 2021-12-15 PROCEDURE — 7100000011 HC PHASE II RECOVERY - ADDTL 15 MIN: Performed by: SURGERY

## 2021-12-15 PROCEDURE — 3700000000 HC ANESTHESIA ATTENDED CARE: Performed by: SURGERY

## 2021-12-15 DEVICE — PORT INFUS 8FR PWR INJ CT FOR VASC ACCS CATH: Type: IMPLANTABLE DEVICE | Site: CHEST | Status: FUNCTIONAL

## 2021-12-15 RX ORDER — SODIUM CHLORIDE 9 MG/ML
INJECTION, SOLUTION INTRAVENOUS CONTINUOUS PRN
Status: DISCONTINUED | OUTPATIENT
Start: 2021-12-15 | End: 2021-12-15 | Stop reason: SDUPTHER

## 2021-12-15 RX ORDER — HEPARIN SODIUM (PORCINE) LOCK FLUSH IV SOLN 100 UNIT/ML 100 UNIT/ML
SOLUTION INTRAVENOUS PRN
Status: DISCONTINUED | OUTPATIENT
Start: 2021-12-15 | End: 2021-12-15 | Stop reason: ALTCHOICE

## 2021-12-15 RX ORDER — GLYCOPYRROLATE 1 MG/5 ML
SYRINGE (ML) INTRAVENOUS PRN
Status: DISCONTINUED | OUTPATIENT
Start: 2021-12-15 | End: 2021-12-15 | Stop reason: SDUPTHER

## 2021-12-15 RX ORDER — SODIUM CHLORIDE 0.9 % (FLUSH) 0.9 %
5-40 SYRINGE (ML) INJECTION PRN
Status: DISCONTINUED | OUTPATIENT
Start: 2021-12-15 | End: 2021-12-15 | Stop reason: HOSPADM

## 2021-12-15 RX ORDER — LIDOCAINE HYDROCHLORIDE 20 MG/ML
INJECTION, SOLUTION INTRAVENOUS PRN
Status: DISCONTINUED | OUTPATIENT
Start: 2021-12-15 | End: 2021-12-15 | Stop reason: SDUPTHER

## 2021-12-15 RX ORDER — FENTANYL CITRATE 50 UG/ML
INJECTION, SOLUTION INTRAMUSCULAR; INTRAVENOUS PRN
Status: DISCONTINUED | OUTPATIENT
Start: 2021-12-15 | End: 2021-12-15 | Stop reason: SDUPTHER

## 2021-12-15 RX ORDER — KETAMINE HCL IN NACL, ISO-OSM 100MG/10ML
SYRINGE (ML) INJECTION PRN
Status: DISCONTINUED | OUTPATIENT
Start: 2021-12-15 | End: 2021-12-15 | Stop reason: SDUPTHER

## 2021-12-15 RX ORDER — ONDANSETRON 2 MG/ML
4 INJECTION INTRAMUSCULAR; INTRAVENOUS
Status: DISCONTINUED | OUTPATIENT
Start: 2021-12-15 | End: 2021-12-15 | Stop reason: HOSPADM

## 2021-12-15 RX ORDER — PROPOFOL 10 MG/ML
INJECTION, EMULSION INTRAVENOUS CONTINUOUS PRN
Status: DISCONTINUED | OUTPATIENT
Start: 2021-12-15 | End: 2021-12-15 | Stop reason: SDUPTHER

## 2021-12-15 RX ORDER — HYDROCODONE BITARTRATE AND ACETAMINOPHEN 5; 325 MG/1; MG/1
1 TABLET ORAL PRN
Status: COMPLETED | OUTPATIENT
Start: 2021-12-15 | End: 2021-12-15

## 2021-12-15 RX ORDER — MIDAZOLAM HYDROCHLORIDE 1 MG/ML
INJECTION INTRAMUSCULAR; INTRAVENOUS PRN
Status: DISCONTINUED | OUTPATIENT
Start: 2021-12-15 | End: 2021-12-15 | Stop reason: SDUPTHER

## 2021-12-15 RX ORDER — SODIUM CHLORIDE 0.9 % (FLUSH) 0.9 %
5-40 SYRINGE (ML) INJECTION EVERY 12 HOURS SCHEDULED
Status: DISCONTINUED | OUTPATIENT
Start: 2021-12-15 | End: 2021-12-15 | Stop reason: HOSPADM

## 2021-12-15 RX ORDER — FENTANYL CITRATE 50 UG/ML
25 INJECTION, SOLUTION INTRAMUSCULAR; INTRAVENOUS EVERY 5 MIN PRN
Status: DISCONTINUED | OUTPATIENT
Start: 2021-12-15 | End: 2021-12-15 | Stop reason: HOSPADM

## 2021-12-15 RX ORDER — HYDROCODONE BITARTRATE AND ACETAMINOPHEN 5; 325 MG/1; MG/1
2 TABLET ORAL PRN
Status: COMPLETED | OUTPATIENT
Start: 2021-12-15 | End: 2021-12-15

## 2021-12-15 RX ORDER — SODIUM CHLORIDE 9 MG/ML
25 INJECTION, SOLUTION INTRAVENOUS PRN
Status: DISCONTINUED | OUTPATIENT
Start: 2021-12-15 | End: 2021-12-15 | Stop reason: HOSPADM

## 2021-12-15 RX ADMIN — Medication 0.2 MG: at 07:25

## 2021-12-15 RX ADMIN — FENTANYL CITRATE 50 MCG: 50 INJECTION, SOLUTION INTRAMUSCULAR; INTRAVENOUS at 07:29

## 2021-12-15 RX ADMIN — MIDAZOLAM 2 MG: 1 INJECTION INTRAMUSCULAR; INTRAVENOUS at 07:25

## 2021-12-15 RX ADMIN — PROPOFOL 75 MCG/KG/MIN: 10 INJECTION, EMULSION INTRAVENOUS at 07:29

## 2021-12-15 RX ADMIN — CEFAZOLIN 3000 MG: 1 INJECTION, POWDER, FOR SOLUTION INTRAMUSCULAR; INTRAVENOUS; PARENTERAL at 07:31

## 2021-12-15 RX ADMIN — Medication 50 MG: at 07:29

## 2021-12-15 RX ADMIN — LIDOCAINE HYDROCHLORIDE 40 MG: 20 INJECTION, SOLUTION INTRAVENOUS at 07:29

## 2021-12-15 RX ADMIN — SODIUM CHLORIDE 25 ML: 9 INJECTION, SOLUTION INTRAVENOUS at 06:32

## 2021-12-15 RX ADMIN — HYDROCODONE BITARTRATE AND ACETAMINOPHEN 1 TABLET: 5; 325 TABLET ORAL at 08:59

## 2021-12-15 RX ADMIN — FENTANYL CITRATE 50 MCG: 50 INJECTION, SOLUTION INTRAMUSCULAR; INTRAVENOUS at 07:38

## 2021-12-15 RX ADMIN — SODIUM CHLORIDE: 9 INJECTION, SOLUTION INTRAVENOUS at 07:25

## 2021-12-15 ASSESSMENT — PAIN SCALES - GENERAL: PAINLEVEL_OUTOF10: 9

## 2021-12-15 ASSESSMENT — PULMONARY FUNCTION TESTS
PIF_VALUE: 0
PIF_VALUE: 1

## 2021-12-15 ASSESSMENT — PAIN - FUNCTIONAL ASSESSMENT: PAIN_FUNCTIONAL_ASSESSMENT: 0-10

## 2021-12-15 NOTE — PROGRESS NOTES
Chest xray results completed. Discharge instructions given and patient verbalizes understanding. Discharged via wheelchair to home.

## 2021-12-15 NOTE — ANESTHESIA PRE PROCEDURE
Department of Anesthesiology  Preprocedure Note       Name:  Katherine Brand   Age:  48 y.o.  :  1975                                          MRN:  [id number]         Date:  12/15/2021      Surgeon: Faina Menon):  Aguila Martin MD    Procedure: Procedure(s):  PORT INSERTION    Medications prior to admission:   Prior to Admission medications    Medication Sig Start Date End Date Taking? Authorizing Provider   cholestyramine (QUESTRAN) 4 g packet Take 4 g by mouth 2 times daily (with meals) 20  Yes Historical Provider, MD   loperamide (IMODIUM) 2 MG capsule Take 2 mg by mouth 4 times daily as needed 10/9/20  Yes Historical Provider, MD   ondansetron (ZOFRAN) 4 MG tablet Take 1 tablet by mouth every 8 hours as needed for Nausea 19  Yes Marianne Holm MD   loratadine (CLARITIN) 10 MG tablet Take 10 mg by mouth as needed    Yes Historical Provider, MD   pregabalin (LYRICA) 150 MG capsule TAKE 1 CAPSULE BY MOUTH TWICE DAILY 3/17/17  Yes Historical Provider, MD   oxyCODONE HCl (OXY-IR) 10 MG immediate release tablet Take 10 mg by mouth every 4 hours as needed.   3/13/17  Yes Historical Provider, MD   Probiotic Product (PROBIOTIC DAILY) CAPS Take 1 tablet by mouth 2 times daily  3/9/17  Yes Historical Provider, MD   enoxaparin (LOVENOX) 40 MG/0.4ML injection  3/11/15  Yes Historical Provider, MD   Venetoclax 100 MG TABS Take 400 mg by mouth 4 times daily  21   Historical Provider, MD   carfilzomib (KYPROLIS) 60 MG chemo injection Infuse 160 mg/m2 intravenously every 7 days Indications: take 160mg IV every Thursday     Historical Provider, MD   albuterol sulfate HFA (VENTOLIN HFA) 108 (90 Base) MCG/ACT inhaler Inhale 1 puff into the lungs every 6 hours as needed for Wheezing 18   WU Cabrera CNP   zolpidem (AMBIEN) 10 MG tablet Take 10 mg by mouth 17   Historical Provider, MD   acyclovir (ZOVIRAX) 400 MG tablet Take 400 mg by mouth 2 times daily  14   Historical Provider, MD Current medications:    Current Facility-Administered Medications   Medication Dose Route Frequency Provider Last Rate Last Admin    sodium chloride flush 0.9 % injection 5-40 mL  5-40 mL IntraVENous 2 times per day Fuentes Lights, APRN - CNP        sodium chloride flush 0.9 % injection 5-40 mL  5-40 mL IntraVENous PRN Fuentes Lights, APRN - CNP        0.9 % sodium chloride infusion  25 mL IntraVENous PRN Fuentes Lights, APRN -  mL/hr at 12/15/21 4577 25 mL at 12/15/21 5895    ceFAZolin (ANCEF) 3,000 mg in dextrose 5 % 100 mL IVPB  3,000 mg IntraVENous On Call to [facility], APRN - CNP         Facility-Administered Medications Ordered in Other Encounters   Medication Dose Route Frequency Provider Last Rate Last Admin    amoxicillin-clavulanate (AUGMENTIN) 875-125 MG per tablet 1 tablet  1 tablet Oral 2 times per day Aye Aceves MD           Allergies:  No Known Allergies    Problem List:    Patient Active Problem List   Diagnosis Code    Multiple myeloma ([facility] [address] C90.00    Neutropenic ([address] D70.9    Pneumonia J18.9    Influenza B J10.1    YUMIKO (obstructive sleep apnea) G47.33    Essential hypertension I10    Sepsis ([address] A41.9    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    Neutropenic fever ([address] D70.9, R50.81    CKD (chronic kidney disease) N18.9    Normocytic anemia D64.9    History of difficult venous access Z87.898       Past Medical History:        Diagnosis Date    Anemia associated with multiple myeloma treated with erythropoietin ([facility] [address]     Asthma     Degeneration of lumbar or lumbosacral intervertebral disc     Kidney lesion     Multiple myeloma ([address]     Obesity        Past Surgical History:        Procedure Laterality Date    BONE MARROW TRANSPLANT      FRACTURE SURGERY  11/1/2014    right ankle orif.external fixator placement    LEG SURGERY Bilateral 12/22/2014    removal ex fix right leg with exam.  fluid aspiration left knee    OTHER SURGICAL HISTORY  11/5/2014 irrigation and debridement right ankle,adjustment of external fixator right leg    TUNNELED CENTRAL VENOUS CATHETER W/ SUBCUTANEOUS PORT         Social History:    Social History     Tobacco Use    Smoking status: Former Smoker     Packs/day: 0.25     Years: 20.00     Pack years: 5.00     Start date:      Quit date: 2021     Years since quittin.7    Smokeless tobacco: Never Used   Substance Use Topics    Alcohol use: No                                Counseling given: Not Answered      Vital Signs (Current):   Vitals:    21 1024 12/15/21 0616   BP:  (!) 146/106   Pulse:  90   Resp:  16   Temp:  97.1 °F (36.2 °C)   TempSrc:  Temporal   SpO2:  96%   Weight: 290 lb (131.5 kg) 290 lb (131.5 kg)   Height: 5' 7\" (1.702 m) 5' 7\" (1.702 m)                                              BP Readings from Last 3 Encounters:   12/15/21 (!) 146/106   04/15/21 (!) 147/94   08/10/20 (!) 144/85       NPO Status: Time of last liquid consumption:                         Time of last solid consumption:                         Date of last liquid consumption: 21                        Date of last solid food consumption: 21    BMI:   Wt Readings from Last 3 Encounters:   12/15/21 290 lb (131.5 kg)   21 292 lb (132.5 kg)   04/15/21 290 lb 9.6 oz (131.8 kg)     Body mass index is 45.42 kg/m².     CBC:   Lab Results   Component Value Date    WBC 5.3 12/15/2021    RBC 3.78 12/15/2021    HGB 9.5 12/15/2021    HCT 32.1 12/15/2021    MCV 84.9 12/15/2021    RDW 19.6 12/15/2021     12/15/2021       CMP:   Lab Results   Component Value Date     12/15/2021    K 3.7 12/15/2021     12/15/2021    CO2 24 12/15/2021    BUN 9 12/15/2021    CREATININE 0.7 12/15/2021    GFRAA >60 12/15/2021    LABGLOM >60 12/15/2021    GLUCOSE 162 12/15/2021    GLUCOSE 94 2010    PROT 6.2 2019    CALCIUM 9.0 12/15/2021    BILITOT 0.4 2019    ALKPHOS 92 2019    AST 30 2019    ALT 30 09/18/2019       POC Tests: No results for input(s): POCGLU, POCNA, POCK, POCCL, POCBUN, POCHEMO, POCHCT in the last 72 hours. Coags:   Lab Results   Component Value Date    PROTIME 10.8 12/15/2021    PROTIME 12.0 11/28/2010    INR 1.0 12/15/2021    APTT 30.7 12/15/2021       HCG (If Applicable):   Lab Results   Component Value Date    PREGTESTUR NEGATIVE 09/18/2019    PREGSERUM NEGATIVE 11/28/2010        ABGs:   Lab Results   Component Value Date    Y0BESNQW 99.7 11/28/2010        Type & Screen (If Applicable):  No results found for: LABABO, LABRH    Drug/Infectious Status (If Applicable):  No results found for: HIV, HEPCAB    COVID-19 Screening (If Applicable): No results found for: COVID19        Anesthesia Evaluation    Airway: Mallampati: II  TM distance: >3 FB   Neck ROM: full  Mouth opening: > = 3 FB Dental:          Pulmonary: breath sounds clear to auscultation  (+) sleep apnea:  asthma:                            Cardiovascular:    (+) hypertension:,         Rhythm: regular                      Neuro/Psych:               GI/Hepatic/Renal:             Endo/Other:                     Abdominal:             Vascular: Other Findings:             Anesthesia Plan      MAC     ASA 3       Induction: intravenous. MIPS: Postoperative opioids intended and Prophylactic antiemetics administered. Anesthetic plan and risks discussed with patient. Plan discussed with CRNA.                   Derek Brody MD   12/15/2021

## 2021-12-15 NOTE — ANESTHESIA POSTPROCEDURE EVALUATION
Department of Anesthesiology  Postprocedure Note    Patient: Anival Mixon  MRN: [id number]  YOB: 1975  Date of evaluation: 12/15/2021  Time:  9:01 AM     Procedure Summary     Date: 12/15/21 Room / Location: [place] [address] / [facility]    Anesthesia Start: 0725 Anesthesia Stop: 4013    Procedure: REMOVAL OF PORT AND REINSERTION OF PORT (Right Chest) Diagnosis: (MULTIPLE MYELOMA)    Surgeons: Shyann Mitchell MD Responsible Provider: Thaddeus Amado MD    Anesthesia Type: MAC ASA Status: 3          Anesthesia Type: MAC    Shreya Phase I: Shreya Score: 10    Shreya Phase II:      Last vitals: Reviewed and per EMR flowsheets.        Anesthesia Post Evaluation    Patient location during evaluation: PACU  Patient participation: complete - patient participated  Level of consciousness: awake  Pain score: 0  Airway patency: patent  Nausea & Vomiting: no nausea  Complications: no  Cardiovascular status: hemodynamically stable  Respiratory status: acceptable  Hydration status: stable

## 2021-12-15 NOTE — H&P
Vascular Surgery History & Physical Exam      Chief Complaint: multiple myeloma    HISTORY OF PRESENT ILLNESS:                The patient is a 48 y.o. female who presents to the hospital for elective port insertion. She denies any problems since the last office visit. IMPRESSION:   Active Hospital Problems    Diagnosis     History of difficult venous access [Z87.898]     Multiple myeloma ([facility] [address] [C90.00]        PLAN:  Venous port insertion. I reviewed the procedure with the patient. I discussed the risks, benefits, and alternatives of the procedure. The patient understands and consents. All questions were answered.         Past Medical History:   Diagnosis Date    Anemia associated with multiple myeloma treated with erythropoietin ([facility] [address]     Asthma     Degeneration of lumbar or lumbosacral intervertebral disc     Kidney lesion     Multiple myeloma ([facility] [address]     Obesity         Past Surgical History:   Procedure Laterality Date    BONE MARROW TRANSPLANT      FRACTURE SURGERY  11/1/2014    right ankle orif.external fixator placement    LEG SURGERY Bilateral 12/22/2014    removal ex fix right leg with exam.  fluid aspiration left knee    OTHER SURGICAL HISTORY  11/5/2014    irrigation and debridement right ankle,adjustment of external fixator right leg    TUNNELED CENTRAL VENOUS CATHETER W/ SUBCUTANEOUS PORT         Current Medications:     Current Facility-Administered Medications:     sodium chloride flush 0.9 % injection 5-40 mL, 5-40 mL, IntraVENous, 2 times per day, WU Kahn - CNP    sodium chloride flush 0.9 % injection 5-40 mL, 5-40 mL, IntraVENous, PRN, Ganesh Long APRN - CNP    0.9 % sodium chloride infusion, 25 mL, IntraVENous, PRN, Ganesh Long APRN - CNP, Last Rate: 100 mL/hr at 12/15/21 0632, 25 mL at 12/15/21 1870    ceFAZolin (ANCEF) 3,000 mg in dextrose 5 % 100 mL IVPB, 3,000 mg, IntraVENous, On Call to [address] [place] [address], WU Kahn CNP    Facility-Administered Medications Ordered in Other Encounters:     amoxicillin-clavulanate (AUGMENTIN) 875-125 MG per tablet 1 tablet, 1 tablet, Oral, 2 times per day, Colin Hooker MD    Allergies:  Patient has no known allergies. Social History     Socioeconomic History    Marital status: Single     Spouse name: Not on file    Number of children: Not on file    Years of education: Not on file    Highest education level: Not on file   Occupational History    Occupation: SSI   Tobacco Use    Smoking status: Former Smoker     Packs/day: 0.25     Years: 20.00     Pack years: 5.00     Start date:      Quit date: 2021     Years since quittin.7    Smokeless tobacco: Never Used   Vaping Use    Vaping Use: Never used   Substance and Sexual Activity    Alcohol use: No    Drug use: No    Sexual activity: Not Currently   Other Topics Concern    Not on file   Social History Narrative    Not on file     Social Determinants of Health     Financial Resource Strain:     Difficulty of Paying Living Expenses: Not on file   Food Insecurity:     Worried About [address] in the Last Year: Not on file    [address] in the Last Year: Not on file   Transportation Needs:     Lack of Transportation (Medical): Not on file    Lack of Transportation (Non-Medical):  Not on file   Physical Activity:     Days of Exercise per Week: Not on file    Minutes of Exercise per Session: Not on file   Stress:     Feeling of Stress : Not on file   Social Connections:     Frequency of Communication with Friends and Family: Not on file    Frequency of Social Gatherings with Friends and Family: Not on file    Attends Islam Services: Not on file    Active Member of Clubs or Organizations: Not on file    Attends Club or Organization Meetings: Not on file    Marital Status: Not on file   Intimate Partner Violence:     Fear of Current or Ex-Partner: Not on file    Emotionally Abused: Not on file    Physically Abused:

## 2021-12-15 NOTE — OP NOTE
Operative Note      Patient: Hazel Lal  YOB: 1975  MRN: [id number]    Date of Procedure: 12/15/2021    Pre-Op Diagnosis: MULTIPLE MYELOMA; mediport malfunction    Post-Op Diagnosis: Same and malposition of old mediport, catheter out of vein       Procedure(s):  REMOVAL OF PORT AND REINSERTION OF PORT    Surgeon(s):  Giuseppe Rebolledo MD    Assistant:   Komal Grijalva DO PGY3  Michael Stevenson    Anesthesia: Monitor Anesthesia Care    Estimated Blood Loss (mL): Minimal    Complications: None    Specimens:   * No specimens in log *    Implants:  Implant Name Type Inv. Item Serial No.  Lot No. LRB No. Used Action   PORT INFUS 8FR PWR INJ CT FOR VASC ACCS CATH  PORT INFUS 8FR PWR INJ CT FOR VASC ACCS CATH  [employer] [id number] Right 1 Implanted         Drains: * No LDAs found *    Findings: malposition of right anterior chest mediport with catheter no longer intravenous. Patency of right IJ for insertion of new mediport      Disposition: To PACU and discharge home after CXR    Indications:  46yo female with multiple myeloma having problems with mediport presents for mediport insertion and removal of old port. This procedure has been fully reviewed with the patient and written informed consent has been obtained. Detailed Description of Procedure:   Operative report:  Patient was brought into the operative suite and after appropriate anesthesia, neck was extended and then prepped and draped in standard surgical fashion. Ancef 3g IV was given. 1% lidocaine with 0.25% marcaine used for local anesthesia. The right internal jugular was noted to be compressibile. The right internal jugular vein was accessed on the 2nd stick with a micropuncture access needle. The microwire was easily passed into the superior vena cava and wire was easily passed into the IVC and checked with fluoro.   A subcutaneous pocket was made over the right chest wall by first anesthetizing with 0.25% marcaine then making a 4cm skin incision with #15 blade scalpel. Bovie electrocautery was used to incise the subcutaneous tissue to creat a pocket for the port. An #11 blade scalpel was used to enlarge the wire insertion site. The micropuncture sheath was passed easily over the wire. The wire was removed and exchanged for a 0.035 wire. The tunneling device was used to tunnel the catheter from the insertion site to the pocket. The dilator and sheath were placed over the wire under fluoro. The dilator was removed and the catheter placed under fluoro. Once in the SVC, the catheter was cut to length and the catheter attached to the locking device and port. The port was secured to rosa's fascia with 3-0 prolene sutures. The port was flushed with heparin and had good return of blood and then flushed. The pocket irrigated with normal saline. The skin was then closed with 3-0 Vicryl in a deep dermal fashion and skin closed with 5-0 vicryl in a running subcuticular fashion. The insertion site was closed with 5-0 vicryl. Next, a #15 blade scalpel was used to make incision over the old scar from prior mediport. Bovie electrocautery was used for skin and subcutaneous tissue dissection. The catheter was identified and delivered into the wound. Dissection proceeded with electrocautery until the port was identified and removed. The port and catheter were inspected and noted to be intact. The scar capsule from the port was excised. Hemostasis achieved with electrocautery. Skin closed with multiple layers, deep layer was approximated with 3-0 vicryl and skin approximated with 5-0 vicryl running subcuticular sutures. The skin was cleaned with wet and dry sponge. The wounds were dressed with Skin-afix. The sponge, instrument and needle counts were correct at the end of the case. The patient tolerated the procedure well. She will now go to recovery room and get a chest x-ray and be discharge home later today.      Selwyn was present and scrubbed for entire procedure    Electronically signed by Jomar Barton DO on 12/15/2021 at 8:34 AM

## 2021-12-27 ENCOUNTER — TELEPHONE (OUTPATIENT)
Dept: VASCULAR SURGERY | Age: 46
End: 2021-12-27

## 2021-12-28 ENCOUNTER — OFFICE VISIT (OUTPATIENT)
Dept: VASCULAR SURGERY | Age: 46
End: 2021-12-28

## 2021-12-28 VITALS
HEIGHT: 67 IN | DIASTOLIC BLOOD PRESSURE: 98 MMHG | BODY MASS INDEX: 45.83 KG/M2 | WEIGHT: 292 LBS | SYSTOLIC BLOOD PRESSURE: 142 MMHG

## 2021-12-28 DIAGNOSIS — Z45.2 ENCOUNTER FOR INSERTION OF VENOUS ACCESS PORT: Primary | ICD-10-CM

## 2021-12-28 PROCEDURE — 99024 POSTOP FOLLOW-UP VISIT: CPT | Performed by: PHYSICIAN ASSISTANT

## 2022-04-05 ENCOUNTER — INITIAL CONSULT (OUTPATIENT)
Dept: NEUROSURGERY | Age: 47
End: 2022-04-05
Payer: COMMERCIAL

## 2022-04-05 VITALS
DIASTOLIC BLOOD PRESSURE: 89 MMHG | BODY MASS INDEX: 45.83 KG/M2 | OXYGEN SATURATION: 97 % | SYSTOLIC BLOOD PRESSURE: 141 MMHG | HEIGHT: 67 IN | HEART RATE: 81 BPM | RESPIRATION RATE: 20 BRPM | WEIGHT: 292 LBS | TEMPERATURE: 98.1 F

## 2022-04-05 DIAGNOSIS — M51.36 LUMBAR DEGENERATIVE DISC DISEASE: Primary | ICD-10-CM

## 2022-04-05 DIAGNOSIS — M47.816 LUMBAR FACET ARTHROPATHY: ICD-10-CM

## 2022-04-05 PROCEDURE — 99204 OFFICE O/P NEW MOD 45 MIN: CPT | Performed by: PHYSICIAN ASSISTANT

## 2022-04-05 PROCEDURE — 1036F TOBACCO NON-USER: CPT | Performed by: PHYSICIAN ASSISTANT

## 2022-04-05 PROCEDURE — G8427 DOCREV CUR MEDS BY ELIG CLIN: HCPCS | Performed by: PHYSICIAN ASSISTANT

## 2022-04-05 PROCEDURE — G8417 CALC BMI ABV UP PARAM F/U: HCPCS | Performed by: PHYSICIAN ASSISTANT

## 2022-04-05 ASSESSMENT — ENCOUNTER SYMPTOMS
ALLERGIC/IMMUNOLOGIC NEGATIVE: 1
BACK PAIN: 1
GASTROINTESTINAL NEGATIVE: 1
EYES NEGATIVE: 1
RESPIRATORY NEGATIVE: 1

## 2022-04-05 NOTE — PROGRESS NOTES
Subjective:      Patient ID: Devan Carlson is a 50 y.o. female. Back Pain  This is a chronic problem. The current episode started more than 1 year ago. The problem occurs daily. The problem has been gradually worsening since onset. The pain is present in the lumbar spine. The quality of the pain is described as aching. The pain is at a severity of 10/10. The symptoms are aggravated by twisting, standing and bending. Associated symptoms include numbness. Treatments tried: lyrica, oxycodone, advil. The treatment provided mild relief. Review of Systems   Constitutional: Negative. HENT: Negative. Eyes: Negative. Respiratory: Negative. Cardiovascular: Negative. Gastrointestinal: Negative. Endocrine: Negative. Genitourinary: Negative. Musculoskeletal: Positive for back pain. Skin: Negative. Allergic/Immunologic: Negative. Neurological: Positive for numbness. Hematological: Negative. Psychiatric/Behavioral: Negative. Objective:   Physical Exam  Constitutional:       Appearance: Normal appearance. HENT:      Head: Normocephalic and atraumatic. Nose: Nose normal.   Eyes:      Pupils: Pupils are equal, round, and reactive to light. Pulmonary:      Effort: Pulmonary effort is normal.   Abdominal:      General: There is no distension. Skin:     General: Skin is warm and dry. Neurological:      Mental Status: She is alert. GCS: GCS eye subscore is 4. GCS verbal subscore is 5. GCS motor subscore is 6. Cranial Nerves: Cranial nerves are intact. Motor: Motor function is intact. Gait: Gait is intact. Deep Tendon Reflexes:      Reflex Scores:       Patellar reflexes are 1+ on the right side and 1+ on the left side. Achilles reflexes are 1+ on the right side and 1+ on the left side. Psychiatric:         Mood and Affect: Mood normal.         Assessment:      50year old female with chronic back pain.   She does have a history of multiple myeloma. Lumbar MRI reveals small L5-S1 disc herniation without significant stenosis. She does have significant facet arthropathy. Normal alignment, but there is evidence of diffuse changes consistent with multiple myeloma. .      Plan: We will order PT and pain management consult. She is not a surgical candidate at this time.          ULYSSES Thornton

## 2022-06-07 ENCOUNTER — OFFICE VISIT (OUTPATIENT)
Dept: VASCULAR SURGERY | Age: 47
End: 2022-06-07
Payer: COMMERCIAL

## 2022-06-07 VITALS — HEIGHT: 67 IN | BODY MASS INDEX: 45.99 KG/M2 | WEIGHT: 293 LBS

## 2022-06-07 DIAGNOSIS — Z78.9 PROBLEM WITH VASCULAR ACCESS: Primary | ICD-10-CM

## 2022-06-07 PROCEDURE — G8427 DOCREV CUR MEDS BY ELIG CLIN: HCPCS | Performed by: SURGERY

## 2022-06-07 PROCEDURE — 1036F TOBACCO NON-USER: CPT | Performed by: SURGERY

## 2022-06-07 PROCEDURE — 99214 OFFICE O/P EST MOD 30 MIN: CPT | Performed by: SURGERY

## 2022-06-07 PROCEDURE — G8417 CALC BMI ABV UP PARAM F/U: HCPCS | Performed by: SURGERY

## 2022-06-07 NOTE — PROGRESS NOTES
Vascular Surgery Outpatient Progress Note      Chief Complaint   Patient presents with    Check-Up     port non-functioning       HISTORY OF PRESENT ILLNESS:                The patient is a 50 y.o. female who returns for follow-up evaluation of port malfunction. She states that she went for chemotherapy for multiple myeloma and her port would not flush. She was able to be treated through intravenous access. Past Medical History:        Diagnosis Date    Anemia associated with multiple myeloma treated with erythropoietin ([facility] [address]     Asthma     Degeneration of lumbar or lumbosacral intervertebral disc     Kidney lesion     Multiple myeloma ([facility] [address]     Obesity      Past Surgical History:        Procedure Laterality Date    BONE MARROW TRANSPLANT      FRACTURE SURGERY  11/1/2014    right ankle orif.external fixator placement    LEG SURGERY Bilateral 12/22/2014    removal ex fix right leg with exam.  fluid aspiration left knee    OTHER SURGICAL HISTORY  11/5/2014    irrigation and debridement right ankle,adjustment of external fixator right leg    PORT SURGERY Right 12/15/2021    REMOVAL OF PORT AND REINSERTION OF PORT performed by Lanette Corrales MD at [address] [facility] [address] SUBCUTANEOUS PORT       Current Medications:   Prior to Admission medications    Medication Sig Start Date End Date Taking?  Authorizing Provider   sulfamethoxazole-trimethoprim (BACTRIM DS;SEPTRA DS) 800-160 MG per tablet TAKE 1 TABLET BY MOUTH EVERY 12 HOURS MONDAY [facility] AND FRIDAY 2/25/22  Yes Historical Provider, MD   cholestyramine (QUESTRAN) 4 g packet Take 4 g by mouth 2 times daily (with meals) 11/11/20  Yes Historical Provider, MD   loperamide (IMODIUM) 2 MG capsule Take 2 mg by mouth 4 times daily as needed 10/9/20  Yes Historical Provider, MD   Venetoclax 100 MG TABS Take 400 mg by mouth 4 times daily  4/5/21  Yes Historical Provider, MD   ondansetron (ZOFRAN) 4 MG tablet Take 1 tablet by mouth every 8 hours as needed for Nausea 19  Yes Leyla Lew MD   carfilzomib (KYPROLIS) 60 MG chemo injection Infuse 160 mg/m2 intravenously every 7 days Indications: take 160mg IV every Thursday    Yes Historical Provider, MD   albuterol sulfate HFA (VENTOLIN HFA) 108 (90 Base) MCG/ACT inhaler Inhale 1 puff into the lungs every 6 hours as needed for Wheezing 18  Yes [facility] - CNP   loratadine (CLARITIN) 10 MG tablet Take 10 mg by mouth as needed    Yes Historical Provider, MD   pregabalin (LYRICA) 150 MG capsule TAKE 1 CAPSULE BY MOUTH TWICE DAILY 3/17/17  Yes Historical Provider, MD   oxyCODONE HCl (OXY-IR) 10 MG immediate release tablet Take 10 mg by mouth every 4 hours as needed. 3/13/17  Yes Historical Provider, MD   zolpidem (AMBIEN) 10 MG tablet Take 10 mg by mouth 17  Yes Historical Provider, MD   enoxaparin (LOVENOX) 40 MG/0.4ML injection  3/11/15  Yes Historical Provider, MD   acyclovir (ZOVIRAX) 400 MG tablet Take 400 mg by mouth 2 times daily  14  Yes Historical Provider, MD     Allergies:  Patient has no known allergies.     Social History     Socioeconomic History    Marital status: Single     Spouse name: Not on file    Number of children: Not on file    Years of education: Not on file    Highest education level: Not on file   Occupational History    Occupation: SSI   Tobacco Use    Smoking status: Former Smoker     Packs/day: 0.25     Years: 20.00     Pack years: 5.00     Start date:      Quit date: 2021     Years since quittin.1    Smokeless tobacco: Never Used   Vaping Use    Vaping Use: Never used   Substance and Sexual Activity    Alcohol use: No    Drug use: No    Sexual activity: Not Currently   Other Topics Concern    Not on file   Social History Narrative    Not on file     Social Determinants of Health     Financial Resource Strain:     Difficulty of Paying Living Expenses: Not on file   Food Insecurity:     Worried About Running []  Gastrointestinal:             Nausea or vomiting:  No [x]/Yes []               Abdominal pain:  No [x]/Yes []                     Intestinal bleeding: No [x]/Yes []  Musculoskeletal:             Leg pain:   No [x]/Yes []      Back pain:   No [x]/Yes []                    Weakness:   No [x]/Yes []  Neurologic:             Numbness:   No [x]/Yes []      Paralysis:   No [x]/Yes []                       Headaches:   No [x]/Yes []  Hematologic, lymphatic:   Anemia:   No [x]/Yes []              Bleeding or bruising:  No [x]/Yes []              Fevers or chills: No [x]/Yes []  Endocrine:             Temp intolerance:   No [x]/Yes []                       Polydipsia, polyuria:  No [x]/Yes []  Skin:              Rash:    No [x]/Yes []      Ulcers:   No [x]/Yes []              Abnorm pigment: No [x]/Yes []  :              Frequency/urgency:  No [x]/Yes []      Hematuria:    No [x]/Yes []                      Incontinence:    No [x]/Yes []    PHYSICAL EXAM:  There were no vitals filed for this visit. General Appearance: alert and oriented to person, place and time, in no acute distress, well developed and well- nourished  Neurologic: no cranial nerve deficit, speech normal  Head: normocephalic and atraumatic  Eyes: extraocular eye movements intact, conjunctivae normal  ENT: external ear and ear canal normal bilaterally, nose without deformity, no carotid bruits  Pulmonary/Chest: normal air movement, no respiratory distress, port appreciated in right subclavian region.   Cardiovascular: normal rate, regular rhythm, no murmur  Abdomen: non-distended, no masses  Musculoskeletal: no joint deformity or tenderness  Extremities: no leg edema bilaterally  Skin: warm and dry, no rash or erythema    PULSE EXAM      Right      Left   Brachial     Radial     Femoral     Popliteal     Dorsalis Pedis     Posterior Tibial     (3=normal, 2=diminished, 1=barely palpable, 4=widened)    RADIOLOGY: [facility] today    Problem List Items Addressed This Visit     Problem with vascular access - Primary          I reviewed with the patient that if the port is not functioning it most likely has a fibrin sheath on the indwelling catheter. I feel that the best treatment for long-term therapy is to replace the port. I may perform balloon angioplasty of the fibrin sheath at that time. She understands and agrees to the plan. I reviewed the procedure with the patient. I discussed the risks, benefits, and alternatives of the procedure. The patient understands and consents. All questions were answered. No follow-ups on file.

## 2022-07-20 NOTE — PROGRESS NOTES
Spoke with Emerson Banuelos at Dr. Peck [place] office discussed patient scheduled for surgery on 7/25 for removal of part, insertion of port right side. Patient reports she has not taken lovenox  since 7/6 as has been instructed not to take (is on hold) as in process of getting a T-CAR cell, request she please inform Dr. Carmela Campa.

## 2022-07-20 NOTE — PROGRESS NOTES
will lift. A free token will be provided to exit the lot. One car per patient is allowed to park in this lot. All other cars are to park on [address] either in the parking garage or the handicap lot. Walk up the front walk to the [facility], the door will be locked an employee will greet you and let you in. EDUCATION INSTRUCTIONS:          [x]Pain: Post-op pain is normal and to be expected. You will be asked to rate your pain from 0-10 (a zero is not acceptable-education is needed). Your post-op pain goal is:   [x] Ask your nurse for your pain medication. MEDICATION INSTRUCTIONS:  [x]Bring a complete list of your medications, please write the last time you took the medicine, give this list to the nurse. [x] Take the following medications the morning of surgery with 1-2 ounces of water:   pregabalin    May take the morning of surgery if needed with a sip of water:  oxycodone HC    [x] Stop herbal supplements and vitamins 5 days before your surgery. Stop ibuprofen (Nsaids) 7 days before your surgery. [x] Follow physician instructions regarding any blood thinners you may be taking. WHAT TO EXPECT:  [x] The day of surgery you will be greeted and checked in by the [employer]. Please bring your photo ID and insurance card. A nurse will greet you in accordance to the time you are needed in the pre-op area to prepare you for surgery. Please do not be discouraged if you are not greeted in the order you arrive as there are many variables that are involved in patient preparation. Your patience is greatly appreciated as you wait for your nurse. Please bring in items such as: books, magazines, newspapers, electronics, or any other items  to occupy your time in the waiting area. [x]  Delays may occur with surgery and staff will make a sincere effort to keep you informed of delays.   If any delays occur with your procedure, we apologize ahead of time for your inconvenience as we recognize the value of your time.

## 2022-07-25 ENCOUNTER — ANESTHESIA (OUTPATIENT)
Dept: OPERATING ROOM | Age: 47
End: 2022-07-25
Payer: COMMERCIAL

## 2022-07-25 ENCOUNTER — ANESTHESIA EVENT (OUTPATIENT)
Dept: OPERATING ROOM | Age: 47
End: 2022-07-25
Payer: COMMERCIAL

## 2022-07-25 ENCOUNTER — HOSPITAL ENCOUNTER (OUTPATIENT)
Age: 47
Setting detail: OUTPATIENT SURGERY
Discharge: HOME | End: 2022-07-25
Attending: SURGERY | Admitting: SURGERY
Payer: COMMERCIAL

## 2022-07-25 ENCOUNTER — APPOINTMENT (OUTPATIENT)
Dept: GENERAL RADIOLOGY | Age: 47
End: 2022-07-25
Attending: SURGERY
Payer: COMMERCIAL

## 2022-07-25 VITALS
HEIGHT: 67 IN | DIASTOLIC BLOOD PRESSURE: 92 MMHG | TEMPERATURE: 97 F | SYSTOLIC BLOOD PRESSURE: 158 MMHG | OXYGEN SATURATION: 96 % | WEIGHT: 293 LBS | RESPIRATION RATE: 17 BRPM | HEART RATE: 78 BPM | BODY MASS INDEX: 45.99 KG/M2

## 2022-07-25 LAB
ANION GAP SERPL CALCULATED.3IONS-SCNC: 15 MMOL/L (ref 7–16)
BUN BLDV-MCNC: 17 MG/DL (ref 6–20)
CALCIUM SERPL-MCNC: 7.7 MG/DL (ref 8.6–10.2)
CHLORIDE BLD-SCNC: 107 MMOL/L (ref 98–107)
CO2: 20 MMOL/L (ref 22–29)
CREAT SERPL-MCNC: 0.8 MG/DL (ref 0.5–1)
GFR AFRICAN AMERICAN: >60
GFR NON-AFRICAN AMERICAN: >60 ML/MIN/1.73
GLUCOSE BLD-MCNC: 169 MG/DL (ref 74–99)
POTASSIUM REFLEX MAGNESIUM: 4.2 MMOL/L (ref 3.5–5)
REASON FOR REJECTION: NORMAL
REJECTED TEST: NORMAL
SODIUM BLD-SCNC: 142 MMOL/L (ref 132–146)

## 2022-07-25 PROCEDURE — C1725 CATH, TRANSLUMIN NON-LASER: HCPCS | Performed by: SURGERY

## 2022-07-25 PROCEDURE — 7100000011 HC PHASE II RECOVERY - ADDTL 15 MIN: Performed by: SURGERY

## 2022-07-25 PROCEDURE — 3700000000 HC ANESTHESIA ATTENDED CARE: Performed by: SURGERY

## 2022-07-25 PROCEDURE — 2500000003 HC RX 250 WO HCPCS: Performed by: SURGERY

## 2022-07-25 PROCEDURE — 3600000002 HC SURGERY LEVEL 2 BASE: Performed by: SURGERY

## 2022-07-25 PROCEDURE — 3600000012 HC SURGERY LEVEL 2 ADDTL 15MIN: Performed by: SURGERY

## 2022-07-25 PROCEDURE — 6360000002 HC RX W HCPCS: Performed by: SURGERY

## 2022-07-25 PROCEDURE — C1769 GUIDE WIRE: HCPCS | Performed by: SURGERY

## 2022-07-25 PROCEDURE — 6360000002 HC RX W HCPCS

## 2022-07-25 PROCEDURE — 6360000002 HC RX W HCPCS: Performed by: PHYSICIAN ASSISTANT

## 2022-07-25 PROCEDURE — 37248 TRLUML BALO ANGIOP 1ST VEIN: CPT | Performed by: SURGERY

## 2022-07-25 PROCEDURE — 80048 BASIC METABOLIC PNL TOTAL CA: CPT

## 2022-07-25 PROCEDURE — 2580000003 HC RX 258: Performed by: SURGERY

## 2022-07-25 PROCEDURE — 3700000001 HC ADD 15 MINUTES (ANESTHESIA): Performed by: SURGERY

## 2022-07-25 PROCEDURE — 36415 COLL VENOUS BLD VENIPUNCTURE: CPT

## 2022-07-25 PROCEDURE — C1788 PORT, INDWELLING, IMP: HCPCS | Performed by: SURGERY

## 2022-07-25 PROCEDURE — 2580000003 HC RX 258: Performed by: PHYSICIAN ASSISTANT

## 2022-07-25 PROCEDURE — 36582 REPLACE TUNNELED CV CATH: CPT | Performed by: SURGERY

## 2022-07-25 PROCEDURE — 7100000010 HC PHASE II RECOVERY - FIRST 15 MIN: Performed by: SURGERY

## 2022-07-25 PROCEDURE — 2709999900 HC NON-CHARGEABLE SUPPLY: Performed by: SURGERY

## 2022-07-25 PROCEDURE — 71045 X-RAY EXAM CHEST 1 VIEW: CPT

## 2022-07-25 PROCEDURE — C1894 INTRO/SHEATH, NON-LASER: HCPCS | Performed by: SURGERY

## 2022-07-25 PROCEDURE — A4217 STERILE WATER/SALINE, 500 ML: HCPCS | Performed by: SURGERY

## 2022-07-25 PROCEDURE — 2580000003 HC RX 258

## 2022-07-25 DEVICE — VACCESS CT POWER-INJECTABLE IMPLANTABLE PORT (WITH SUTURE PLUGS) (8F)
Type: IMPLANTABLE DEVICE | Site: SUBCLAVIAN | Status: FUNCTIONAL
Brand: VACCESS

## 2022-07-25 RX ORDER — HEPARIN SODIUM (PORCINE) LOCK FLUSH IV SOLN 100 UNIT/ML 100 UNIT/ML
SOLUTION INTRAVENOUS PRN
Status: DISCONTINUED | OUTPATIENT
Start: 2022-07-25 | End: 2022-07-25 | Stop reason: ALTCHOICE

## 2022-07-25 RX ORDER — MIDAZOLAM HYDROCHLORIDE 1 MG/ML
INJECTION INTRAMUSCULAR; INTRAVENOUS PRN
Status: DISCONTINUED | OUTPATIENT
Start: 2022-07-25 | End: 2022-07-25 | Stop reason: SDUPTHER

## 2022-07-25 RX ORDER — SODIUM CHLORIDE 9 MG/ML
INJECTION, SOLUTION INTRAVENOUS CONTINUOUS
Status: DISCONTINUED | OUTPATIENT
Start: 2022-07-25 | End: 2022-07-25 | Stop reason: HOSPADM

## 2022-07-25 RX ORDER — SODIUM CHLORIDE, SODIUM LACTATE, POTASSIUM CHLORIDE, CALCIUM CHLORIDE 600; 310; 30; 20 MG/100ML; MG/100ML; MG/100ML; MG/100ML
INJECTION, SOLUTION INTRAVENOUS CONTINUOUS PRN
Status: DISCONTINUED | OUTPATIENT
Start: 2022-07-25 | End: 2022-07-25 | Stop reason: SDUPTHER

## 2022-07-25 RX ORDER — PROPOFOL 10 MG/ML
INJECTION, EMULSION INTRAVENOUS CONTINUOUS PRN
Status: DISCONTINUED | OUTPATIENT
Start: 2022-07-25 | End: 2022-07-25 | Stop reason: SDUPTHER

## 2022-07-25 RX ORDER — SODIUM CHLORIDE 0.9 % (FLUSH) 0.9 %
5-40 SYRINGE (ML) INJECTION EVERY 12 HOURS SCHEDULED
Status: DISCONTINUED | OUTPATIENT
Start: 2022-07-25 | End: 2022-07-25 | Stop reason: HOSPADM

## 2022-07-25 RX ORDER — PROPOFOL 10 MG/ML
INJECTION, EMULSION INTRAVENOUS PRN
Status: DISCONTINUED | OUTPATIENT
Start: 2022-07-25 | End: 2022-07-25

## 2022-07-25 RX ORDER — SODIUM CHLORIDE 9 MG/ML
INJECTION, SOLUTION INTRAVENOUS PRN
Status: DISCONTINUED | OUTPATIENT
Start: 2022-07-25 | End: 2022-07-25 | Stop reason: HOSPADM

## 2022-07-25 RX ORDER — FENTANYL CITRATE 50 UG/ML
INJECTION, SOLUTION INTRAMUSCULAR; INTRAVENOUS PRN
Status: DISCONTINUED | OUTPATIENT
Start: 2022-07-25 | End: 2022-07-25 | Stop reason: SDUPTHER

## 2022-07-25 RX ORDER — SODIUM CHLORIDE 0.9 % (FLUSH) 0.9 %
5-40 SYRINGE (ML) INJECTION PRN
Status: DISCONTINUED | OUTPATIENT
Start: 2022-07-25 | End: 2022-07-25 | Stop reason: HOSPADM

## 2022-07-25 RX ADMIN — CEFAZOLIN 3000 MG: 10 INJECTION, POWDER, FOR SOLUTION INTRAVENOUS at 12:57

## 2022-07-25 RX ADMIN — FENTANYL CITRATE 50 MCG: 50 INJECTION, SOLUTION INTRAMUSCULAR; INTRAVENOUS at 12:53

## 2022-07-25 RX ADMIN — MIDAZOLAM 2 MG: 1 INJECTION INTRAMUSCULAR; INTRAVENOUS at 12:43

## 2022-07-25 RX ADMIN — PROPOFOL 75 MCG/KG/MIN: 10 INJECTION, EMULSION INTRAVENOUS at 12:53

## 2022-07-25 RX ADMIN — SODIUM CHLORIDE, POTASSIUM CHLORIDE, SODIUM LACTATE AND CALCIUM CHLORIDE: 600; 310; 30; 20 INJECTION, SOLUTION INTRAVENOUS at 12:45

## 2022-07-25 RX ADMIN — FENTANYL CITRATE 50 MCG: 50 INJECTION, SOLUTION INTRAMUSCULAR; INTRAVENOUS at 13:07

## 2022-07-25 ASSESSMENT — PAIN SCALES - GENERAL
PAINLEVEL_OUTOF10: 0

## 2022-07-25 NOTE — DISCHARGE INSTRUCTIONS
AMBULATORY PROCEDURE DISCHARGE INSTRUCTIONS    You may be drowsy or lightheaded after receiving sedation or anesthesia. A responsible person should be with you for the next 24 hours. Please follow the instructions checked below:    DIET INSTRUCTIONS:  [x]Start with light diet and progress to your normal diet as you feel like eating. If you experience nausea or repeated episodes of vomiting which persist beyond 12-24 hours, notify your doctor. []Other     ACTIVITY INSTRUCTIONS:  [x]Rest today. Increase activity as tolerated    []Elevate operative limb   []No heavy lifting or strenuous activity     []Other     WOUND/DRESSING INSTRUCTIONS:  Always ensure you and your care giver clean hands before and after caring for the wound. [x]May shower      []May bathe      [x]Derma bond dressing-Do not apply lotion, gel, or liquid to wound while the derma bond is in place. []Other         MEDICATION INSTRUCTIONS:    []Prescriptions sent with you. Use as directed. When taking pain medications, you may experience dizziness or drowsiness. Do not drink alcohol or drive when taking these medications. [x]You may take a non-prescription headache remedy, preferably one that does not contain aspirin. Also, you may take your pain medication that you are already currently prescribed as needed. Call physician if they or any other problems occur:  Fever over 101°    Redness, swelling, hardness or warmth at the operative site  Unrelieved nausea    Foul smelling or cloudy drainage at the operative site   Unrelieved pain    Blood soaked dressing.  (Some oozing may be normal)      POSTOPERATIVE FOLLOW UP:    Please call [phone] with any questions or concerns and to schedule a follow up visit in 2 weeks with Dr. Balbina Braden

## 2022-07-25 NOTE — H&P
Vascular Surgery History & Physical Exam      Chief Complaint: venous port    HISTORY OF PRESENT ILLNESS:                The patient is a 50 y.o. female who presents to the hospital for elective port revision/replacement. She denies any problems since the last office visit. IMPRESSION:   Active Hospital Problems    Diagnosis     Encounter for insertion of venous access port [Z45.2]     Multiple myeloma ([facility] [address] [C90.00]        PLAN:  Replacement venous port. I reviewed the procedure with the patient. I discussed the risks, benefits, and alternatives of the procedure. The patient understands and consents. All questions were answered.         Past Medical History:   Diagnosis Date    Anemia associated with multiple myeloma treated with erythropoietin ([address]     Asthma     Degeneration of lumbar or lumbosacral intervertebral disc     Kidney lesion 2018    Patient states cancer of left    Liver mass 2018    Patient states was informed is benign    Multiple myeloma ([facility] [address]     Obesity         Past Surgical History:   Procedure Laterality Date    BONE MARROW TRANSPLANT      FRACTURE SURGERY  11/01/2014    right ankle orif.external fixator placement    KIDNEY SURGERY Left 2018    Patient states partial kidney removal for cancer    LEG SURGERY Bilateral 12/22/2014    removal ex fix right leg with exam.  fluid aspiration left knee    OTHER SURGICAL HISTORY  11/05/2014    irrigation and debridement right ankle,adjustment of external fixator right leg    PORT SURGERY Right 12/15/2021    REMOVAL OF PORT AND REINSERTION OF PORT performed by Marcelo Welch MD at [address] [facility] W/ SUBCUTANEOUS PORT         Current Medications:     Current Facility-Administered Medications:     0.9 % sodium chloride infusion, , IntraVENous, Continuous, Rebecca Balbuena PA-C    sodium chloride flush 0.9 % injection 5-40 mL, 5-40 mL, IntraVENous, 2 times per day, Rebecca Balbuena PA-C    sodium chloride flush 0.9 % injection 5-40 mL, 5-40 mL, IntraVENous, PRN, Rebecca Balbuena PA-C    0.9 % sodium chloride infusion, , IntraVENous, PRN, Rebecca Balbuena PA-C    ceFAZolin (ANCEF) 3,000 mg in dextrose 5 % 100 mL IVPB, 3,000 mg, IntraVENous, On Call to OR, [address] [place] [address], PAClaritza    Facility-Administered Medications Ordered in Other Encounters:     amoxicillin-clavulanate (AUGMENTIN) 875-125 MG per tablet 1 tablet, 1 tablet, Oral, 2 times per day, Domingo Mayfield MD    Allergies:  Patient has no known allergies. Social History     Socioeconomic History    Marital status: Single     Spouse name: Not on file    Number of children: Not on file    Years of education: Not on file    Highest education level: Not on file   Occupational History    Occupation: SSI   Tobacco Use    Smoking status: Former     Packs/day: 0.25     Years: 20.00     Pack years: 5.00     Types: Cigarettes     Start date:      Quit date: 2021     Years since quittin.3    Smokeless tobacco: Never   Vaping Use    Vaping Use: Never used   Substance and Sexual Activity    Alcohol use: No    Drug use: No    Sexual activity: Not Currently   Other Topics Concern    Not on file   Social History Narrative    Not on file     Social Determinants of Health     Financial Resource Strain: Not on file   Food Insecurity: Not on file   Transportation Needs: Not on file   Physical Activity: Not on file   Stress: Not on file   Social Connections: Not on file   Intimate Partner Violence: Not on file   Housing Stability: Not on file        Family History   Problem Relation Age of Onset    High Blood Pressure Mother     Cancer Father        REVIEW OF SYSTEMS:  The chart was reviewed.     PHYSICAL EXAM:    Vitals:    22 1145   BP: (!) 151/73   Pulse: 83   Resp: 18   Temp: 97.6 °F (36.4 °C)   SpO2: 96%     CONSTITUTIONAL:  awake, alert, cooperative, no apparent distress, and appears stated age  NECK:  supple, symmetrical, trachea midline  LUNGS:  no increased work of breathing, good air exchange and clear to auscultation  CARDIOVASCULAR:  regular rate and rhythm  ABDOMEN:  soft, non-distended and non-tender    LABS:    Lab Results   Component Value Date    WBC 5.3 12/15/2021    HGB 9.5 (L) 12/15/2021    HCT 32.1 (L) 12/15/2021     12/15/2021    PROTIME 10.8 12/15/2021    INR 1.0 12/15/2021    APTT 30.7 12/15/2021    K 3.7 12/15/2021    BUN 9 12/15/2021    CREATININE 0.7 12/15/2021       RADIOLOGY:

## 2022-07-25 NOTE — H&P
Vascular Surgery History & Physical Exam     Chief Complaint: Malfunctioning mediport     HISTORY OF PRESENT ILLNESS:                 Patient is a 50year old female with a history of multiple myeloma on chemotherapy. She was previously undergoing chemo per her right IJ mediport which was placed in 12/2021 after her previous port malfunctioned. She states that her current port became unable to flush in April 2022 and since that time she has been receiving her chemo therapy via peripheral IV's. She denies any pain/swelling, or fever/chills associated with her current port over her right chest.     IMPRESSION:  Malfunctioning Right IJ Mediport in the setting of multiple myeloma on chemotherapy     PLAN: Mediport removal and re insertion in OR with possible balloon angioplasty. I reviewed the procedure with the patient and family as available. I discussed the procedure, risks, benefits, complications, and alternatives of the procedure. They understand and consent.   All questions were answered     ROS : All others Negative if blank [], Positive if [x]  General   [] Fevers   [] Chills   [] Weight Loss   Skin   [] Tissue Loss   Eyes   [] Wears Glasses/Contacts   [] Vision Changes   Respiratory    [] Shortness of breath   Cardiovascular   [] Chest Pain   [] Shortness of breath with exertion   Gastrointestinal   [] Abdominal Pain         Past Medical History:   Diagnosis Date    Anemia associated with multiple myeloma treated with erythropoietin ([address]     Asthma     Degeneration of lumbar or lumbosacral intervertebral disc     Kidney lesion 2018    Patient states cancer of left    Liver mass 2018    Patient states was informed is benign    Multiple myeloma ([address]     Obesity         Past Surgical History:   Procedure Laterality Date    BONE MARROW TRANSPLANT      FRACTURE SURGERY  11/01/2014    right ankle orif.external fixator placement    KIDNEY SURGERY Left 2018    Patient states partial kidney removal for cancer LEG SURGERY Bilateral 2014    removal ex fix right leg with exam.  fluid aspiration left knee    OTHER SURGICAL HISTORY  2014    irrigation and debridement right ankle,adjustment of external fixator right leg    PORT SURGERY Right 12/15/2021    REMOVAL OF PORT AND REINSERTION OF PORT performed by Nicolas Almaraz MD at [address]/ SUBCUTANEOUS PORT         Current Medications:    sodium chloride      sodium chloride        sodium chloride flush, sodium chloride    sodium chloride flush  5-40 mL IntraVENous 2 times per day    ceFAZolin (ANCEF) IVPB  3,000 mg IntraVENous On Call to OR        Allergies:  Patient has no known allergies. Social History     Socioeconomic History    Marital status: Single     Spouse name: Not on file    Number of children: Not on file    Years of education: Not on file    Highest education level: Not on file   Occupational History    Occupation: [employer]SI   Tobacco Use    Smoking status: Former     Packs/day: 0.25     Years: 20.00     Pack years: 5.00     Types: Cigarettes     Start date:      Quit date: 2021     Years since quittin.3    Smokeless tobacco: Never   Vaping Use    Vaping Use: Never used   Substance and Sexual Activity    Alcohol use: No    Drug use: No    Sexual activity: Not Currently   Other Topics Concern    Not on file   Social History Narrative    Not on file     Social Determinants of Health     Financial Resource Strain: Not on file   Food Insecurity: Not on file   Transportation Needs: Not on file   Physical Activity: Not on file   Stress: Not on file   Social Connections: Not on file   Intimate Partner Violence: Not on file   Housing Stability: Not on file        Family History   Problem Relation Age of Onset    High Blood Pressure Mother     Cancer Father         REVIEW OF SYSTEMS:  The chart was reviewed.      PHYSICAL EXAM:    Vitals:    22 1145   BP: (!) 151/73   Pulse: 83   Resp: 18   Temp: 97.6 °F (36.4 °C)   SpO2: 96%        CONSTITUTIONAL:  awake, alert, cooperative, no apparent distress, and appears stated age  NECK:  supple, symmetrical, trachea midline  Chest: Prior mediport over right chest with well healed scar noted. No signs of redness, swelling, or infection. LUNGS:  no increased work of breathing, good resp excursion  CARDIOVASCULAR:  regular rate and rhythm   ABDOMEN:  soft, non-distended and non-tender  EXTREMITIES: 2+ Bilateral Radial pulses. LABS:    CBC  No results for input(s): WBC, HGB, HCT, PLT in the last 72 hours. BMP  No results for input(s): NA, K, CL, CO2, BUN, CREATININE, GLU, CALCIUM in the last 72 hours. Liver Function  No results for input(s): AMYLASE, LIPASE, BILITOT, BILIDIR, AST, ALT, ALKPHOS, PROT, LABALBU in the last 72 hours. No results for input(s): LACTATE in the last 72 hours. No results for input(s): INR, PTT in the last 72 hours.     Invalid input(s): PT        Electronically signed by Luis Daniel Sow DO on 7/25/2022 at 12:08 PM

## 2022-07-25 NOTE — OP NOTE
Operative Note      Patient: Qasim Chinchilla  YOB: 1975  MRN: [id number]    Date of Procedure: 7/25/2022    Pre-Op Diagnosis: MULTIPLE MYELOMA    Post-Op Diagnosis: Same with malfunctioning mediport        Procedure(s):  REMOVAL AND REINSERTION OF CENTRAL VENOUS CATHETER WITH SUBCUTANOUS RESERVOIR, POSSIBLE BALLON ANGIOPLASTY    Surgeon(s):  Oscar Bailey MD    Assistant:   * No surgical staff found *    Anesthesia: Monitor Anesthesia Care    Estimated Blood Loss (mL): Minimal    Complications: None    Specimens:   * No specimens in log *    Implants:  Implant Name Type Inv. Item Serial No.  Lot No. LRB No. Used Action   PORT INFUS 8FR PWR INJ CT FOR VASC ACCS CATH - [id number]  PORT INFUS 8FR PWR INJ CT FOR VASC ACCS CATH  [employer]- [id number] Right 1 Implanted         Drains: * No LDAs found *    Findings: None    Detailed DESCRIPTION OF PROCEDURE: The patient was identified and the procedure was confirmed. The right neck and chest were prepped and draped in the usual sterile fashion. Next, 1% lidocaine mixed with 0.25% Marcaine was used for local anesthesia. A skin incision was made through the previous incision scar and continue through the subcutaneous tissues. The reservoir and catheter were freed from the surrounding tissues. A neck incision was made over the prior neck incision scar and dissection was carried down to the prior Mediport catheter which was identified and clamped with a hemostat x 2. The catheter was then divided. A guidewire was advanced through the divided catheter. Proper position of the guidewire was confirmed with fluoroscopy in the Cavo-Atrial Junction. The cut Mediport catheter was removed over the wire. A 7 Divehi sheath was inserted over guidewire into right internal jugular vein under fluoroscopic guidance. Introducer was removed from the sheath.  An  angioplasty balloon was advanced over the wire and prior mediport tract was dilated via balloon angioplasty under fluoroscopic guidance. Angioplasty balloon was then removed followed by the 7 Dutch sheath with guidewire still in place. The Mediport introducer was passed over the wire then the catheter was passed through the introducer and the tip was positioned in the superior vena cava right atrial junction under fluoroscopic guidance. The catheter was pulled through a subcutaneous tunnel from the neck to the inferior chest incision. The prior Mediport reservoir was then dissected free from the subcutaneous pocket and removed. The catheter was connected to the new reservoir and the locking hub was engaged. The port was noted to withdrawal and flush blood easily and was flushed with heparinized saline solution. The reservoir was secured in the pocket with Prolene sutures. Hemostasis was achieved and the incision was closed with Vicryl sutures. Skin adhesive was applied to the incision in the operating room. Needle, sponge, and instrument counts were reported as correct x2. The patient tolerated the procedure and was transferred to the recovery area in satisfactory condition.       Adis Kessler DO      Electronically signed by Adis Kessler DO on 7/25/2022 at 2:26 PM

## 2022-07-25 NOTE — ANESTHESIA PRE PROCEDURE
MG/0.4ML injection daily 3/11/15   Historical Provider, MD   acyclovir (ZOVIRAX) 400 MG tablet Take 400 mg by mouth 2 times daily  11/25/14   Historical Provider, MD       Current medications:    No current facility-administered medications for this visit. No current outpatient medications on file.      Facility-Administered Medications Ordered in Other Visits   Medication Dose Route Frequency Provider Last Rate Last Admin    0.9 % sodium chloride infusion   IntraVENous Continuous Rebecca Balbuena PA-C        sodium chloride flush 0.9 % injection 5-40 mL  5-40 mL IntraVENous 2 times per day Rebecca Balbuena PA-C        sodium chloride flush 0.9 % injection 5-40 mL  5-40 mL IntraVENous PRN Rebecca Balbuena PA-C        0.9 % sodium chloride infusion   IntraVENous PRN Rebecca Balbuena PA-C        ceFAZolin (ANCEF) 3,000 mg in dextrose 5 % 100 mL IVPB  3,000 mg IntraVENous On Call to [address] [place] [address] [place] Price        amoxicillin-clavulanate (AUGMENTIN) 875-125 MG per tablet 1 tablet  1 tablet Oral 2 times per day Latha Nguyen MD           Allergies:  No Known Allergies    Problem List:    Patient Active Problem List   Diagnosis Code    Multiple myeloma ([facility] [address] C90.00    Neutropenic ([address] D70.9    Pneumonia J18.9    Influenza B J10.1    YUMIKO (obstructive sleep apnea) G47.33    Essential hypertension I10    Sepsis ([address] A41.9    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    Neutropenic fever (HCC) D70.9, R50.81    CKD (chronic kidney disease) N18.9    Normocytic anemia D64.9    History of difficult venous access Z87.898    Encounter for insertion of venous access port Z45.2    Problem with vascular access Z78.9       Past Medical History:        Diagnosis Date    Anemia associated with multiple myeloma treated with erythropoietin ([facility] [address]     Asthma     Degeneration of lumbar or lumbosacral intervertebral disc     Kidney lesion 2018    Patient states cancer of left    Liver mass 2018    Patient states was informed is benign    Multiple myeloma ([facility] [address]     Obesity        Past Surgical History:        Procedure Laterality Date    BONE MARROW TRANSPLANT      FRACTURE SURGERY  2014    right ankle orif.external fixator placement    KIDNEY SURGERY Left 2018    Patient states partial kidney removal for cancer    LEG SURGERY Bilateral 2014    removal ex fix right leg with exam.  fluid aspiration left knee    OTHER SURGICAL HISTORY  2014    irrigation and debridement right ankle,adjustment of external fixator right leg    PORT SURGERY Right 12/15/2021    REMOVAL OF PORT AND REINSERTION OF PORT performed by Uriel Brown MD at [address] [facility] [address] SUBCUTANEOUS PORT         Social History:    Social History     Tobacco Use    Smoking status: Former     Packs/day: 0.25     Years: 20.00     Pack years: 5.00     Types: Cigarettes     Start date:      Quit date: 2021     Years since quittin.3    Smokeless tobacco: Never   Substance Use Topics    Alcohol use: No                                Counseling given: Not Answered      Vital Signs (Current): There were no vitals filed for this visit.                                            BP Readings from Last 3 Encounters:   22 (!) 151/73   22 (!) 141/89   21 (!) 142/98       NPO Status:                                                                                 BMI:   Wt Readings from Last 3 Encounters:   22 293 lb (132.9 kg)   22 293 lb (132.9 kg)   22 292 lb (132.5 kg)     There is no height or weight on file to calculate BMI.    CBC:   Lab Results   Component Value Date/Time    WBC 5.3 12/15/2021 06:28 AM    RBC 3.78 12/15/2021 06:28 AM    HGB 9.5 12/15/2021 06:28 AM    HCT 32.1 12/15/2021 06:28 AM    MCV 84.9 12/15/2021 06:28 AM    RDW 19.6 12/15/2021 06:28 AM     12/15/2021 06:28 AM       CMP:   Lab Results   Component Value Date/Time     12/15/2021 06:28 AM K 3.7 12/15/2021 06:28 AM     12/15/2021 06:28 AM    CO2 24 12/15/2021 06:28 AM    BUN 9 12/15/2021 06:28 AM    CREATININE 0.7 12/15/2021 06:28 AM    GFRAA >60 12/15/2021 06:28 AM    LABGLOM >60 12/15/2021 06:28 AM    GLUCOSE 162 12/15/2021 06:28 AM    GLUCOSE 94 11/29/2010 06:17 AM    PROT 6.2 09/18/2019 09:18 PM    CALCIUM 9.0 12/15/2021 06:28 AM    BILITOT 0.4 09/18/2019 09:18 PM    ALKPHOS 92 09/18/2019 09:18 PM    AST 30 09/18/2019 09:18 PM    ALT 30 09/18/2019 09:18 PM       POC Tests: No results for input(s): POCGLU, POCNA, POCK, POCCL, POCBUN, POCHEMO, POCHCT in the last 72 hours. Coags:   Lab Results   Component Value Date/Time    PROTIME 10.8 12/15/2021 06:28 AM    PROTIME 12.0 11/28/2010 01:24 AM    INR 1.0 12/15/2021 06:28 AM    APTT 30.7 12/15/2021 06:28 AM       HCG (If Applicable):   Lab Results   Component Value Date    PREGTESTUR NEGATIVE 09/18/2019    PREGSERUM NEGATIVE 11/28/2010        ABGs:   Lab Results   Component Value Date/Time    G2FCOVLW 99.7 11/28/2010 01:30 AM        Type & Screen (If Applicable):  No results found for: LABABO, LABRH    Drug/Infectious Status (If Applicable):  No results found for: HIV, HEPCAB    COVID-19 Screening (If Applicable): No results found for: COVID19        Anesthesia Evaluation    Airway: Mallampati: II  TM distance: >3 FB   Neck ROM: full  Mouth opening: > = 3 FB   Dental:          Pulmonary: breath sounds clear to auscultation  (+) sleep apnea:  asthma:                            Cardiovascular:    (+) hypertension:,         Rhythm: regular                      Neuro/Psych:               GI/Hepatic/Renal:             Endo/Other:                     Abdominal:             Vascular: Other Findings:             Anesthesia Plan      MAC     ASA 3       Induction: intravenous. MIPS: Postoperative opioids intended and Prophylactic antiemetics administered. Anesthetic plan and risks discussed with patient.       Plan discussed with Keanu Gibson MD   7/25/2022

## 2022-08-08 ENCOUNTER — TELEPHONE (OUTPATIENT)
Dept: VASCULAR SURGERY | Age: 47
End: 2022-08-08

## 2022-08-09 ENCOUNTER — TELEPHONE (OUTPATIENT)
Dept: VASCULAR SURGERY | Age: 47
End: 2022-08-09

## 2022-08-09 NOTE — TELEPHONE ENCOUNTER
Called to cancel appt for 8-9-2022 with Dr Palm Early as she is still at her other dr appt. Rescheduled appt.

## 2022-08-23 ENCOUNTER — OFFICE VISIT (OUTPATIENT)
Dept: VASCULAR SURGERY | Age: 47
End: 2022-08-23

## 2022-08-23 VITALS — BODY MASS INDEX: 44.42 KG/M2 | HEIGHT: 67 IN | WEIGHT: 283 LBS

## 2022-08-23 DIAGNOSIS — Z78.9 PROBLEM WITH VASCULAR ACCESS: Primary | ICD-10-CM

## 2022-08-23 DIAGNOSIS — C90.00 MULTIPLE MYELOMA NOT HAVING ACHIEVED REMISSION (HCC): ICD-10-CM

## 2022-08-23 PROCEDURE — 99024 POSTOP FOLLOW-UP VISIT: CPT | Performed by: SURGERY

## 2022-08-23 NOTE — PROGRESS NOTES
Vascular Surgery Progress Note    Chief Complaint   Patient presents with    Post-Op Check     port       Patient returns for post operative evaluation status post Mediport revision. The patient denies any unexpected problems since hospital discharge. She states that the port is functioning without problem. Procedure Laterality Date    BONE MARROW TRANSPLANT      FRACTURE SURGERY  11/01/2014    right ankle orif.external fixator placement    KIDNEY SURGERY Left 2018    Patient states partial kidney removal for cancer    LEG SURGERY Bilateral 12/22/2014    removal ex fix right leg with exam.  fluid aspiration left knee    OTHER SURGICAL HISTORY  11/05/2014    irrigation and debridement right ankle,adjustment of external fixator right leg    PORT SURGERY Right 12/15/2021    REMOVAL OF PORT AND REINSERTION OF PORT performed by Yari Barragan MD at [address] [facility] Right 7/25/2022    REMOVAL AND REINSERTION OF CENTRAL VENOUS CATHETER WITH SUBCUTANOUS RESERVOIR, POSSIBLE BALLON ANGIOPLASTY performed by Yari Barragan MD at [address] [facility] [address] W/ SUBCUTANEOUS PORT         Physical Exam:  The incision(s) are healing without evidence of infection. Heart rhythm is regular. Right      Left   Brachial     Radial     Femoral     Popliteal     Dorsalis Pedis     Posterior Tibial     (3=normal, 2=diminished, 1=barely palpable, 4=widened)    Problem List Items Addressed This Visit       Problem with vascular access - Primary    Multiple myeloma ([address] (Chronic)       I reviewed with the patient that normal activities can be resumed as tolerated. Plan: Return as needed.

## 2022-11-15 PROBLEM — R73.01 IMPAIRED FASTING GLUCOSE: Status: ACTIVE | Noted: 2018-09-20

## 2022-11-15 PROBLEM — J09.X2 INFLUENZA DUE TO IDENTIFIED NOVEL INFLUENZA A VIRUS WITH OTHER RESPIRATORY MANIFESTATIONS: Status: ACTIVE | Noted: 2018-02-19

## 2023-02-07 PROBLEM — C64.9 CLEAR CELL CARCINOMA OF KIDNEY (HCC): Status: ACTIVE | Noted: 2023-02-07

## 2023-07-26 NOTE — PROGRESS NOTES
Vascular Surgery Progress Note    Chief Complaint   Patient presents with    Post-Op Check     Patient returns for post operative evaluation status post removal and reinsertion of mediport. She has used the port and it is functioning without issue. Procedure Laterality Date    BONE MARROW TRANSPLANT      FRACTURE SURGERY  11/1/2014    right ankle orif.external fixator placement    LEG SURGERY Bilateral 12/22/2014    removal ex fix right leg with exam.  fluid aspiration left knee    OTHER SURGICAL HISTORY  11/5/2014    irrigation and debridement right ankle,adjustment of external fixator right leg    PORT SURGERY Right 12/15/2021    REMOVAL OF PORT AND REINSERTION OF PORT performed by Nicolas Almaraz MD at [address] [facility] [facility] SUBCUTANEOUS PORT         Physical Exam:  The incision(s) are healing without evidence of infection. Heart rhythm is regular. Right      Left   Brachial     Radial 2 2   Femoral     Popliteal     Dorsalis Pedis     Posterior Tibial     (3=normal, 2=diminished, 1=barely palpable, 4=widened)    Problem List Items Addressed This Visit     Encounter for insertion of venous access port - Primary          I reviewed with the patient that normal activities can be resumed as tolerated. I asked her to call with any issues. Plan: Return PRN    Pt seen and plan reviewed with Dr. Nirmal Fernandez.      Emeka Rodriguez PA-C Purse String (Intermediate) Text: Given the location of the defect and the characteristics of the surrounding skin a purse string intermediate closure was deemed most appropriate.  Undermining was performed circumferentially around the surgical defect.  A purse string suture was then placed and tightened.

## 2024-05-03 PROBLEM — G47.33 OSA ON CPAP: Status: ACTIVE | Noted: 2024-05-03

## 2025-01-09 ENCOUNTER — HOSPITAL ENCOUNTER (OUTPATIENT)
Age: 50
Discharge: HOME | End: 2025-01-09
Payer: COMMERCIAL

## 2025-01-09 PROCEDURE — 36415 COLL VENOUS BLD VENIPUNCTURE: CPT

## 2025-01-09 PROCEDURE — 87389 HIV-1 AG W/HIV-1&-2 AB AG IA: CPT

## 2025-01-09 PROCEDURE — 86592 SYPHILIS TEST NON-TREP QUAL: CPT

## 2025-01-09 PROCEDURE — 80074 ACUTE HEPATITIS PANEL: CPT

## 2025-01-10 LAB
HAV IGM SERPL QL IA: NONREACTIVE
HBV CORE IGM SERPL QL IA: NONREACTIVE
HBV SURFACE AG SERPL QL IA: NONREACTIVE
HCV AB SERPL QL IA: NONREACTIVE
HIV 1+2 AB+HIV1 P24 AG SERPL QL IA: NONREACTIVE
RPR SER QL: NONREACTIVE

## (undated) DEVICE — SHEET, T, LAPAROTOMY, STERILE: Brand: MEDLINE

## (undated) DEVICE — MINOR VASCULAR: Brand: MEDLINE INDUSTRIES, INC.

## (undated) DEVICE — BLADE CLIPPER GEN PURP NS

## (undated) DEVICE — DRAPE EQUIP BANDED BG 36X28 IN W/ROUNDED CORNER SNAPKOVER

## (undated) DEVICE — GLOVE SURG SZ 7.5 L11.73IN FNGR THK9.8MIL STRW LTX POLYMER

## (undated) DEVICE — DRAPE SHEET: Brand: UNBRANDED

## (undated) DEVICE — DRAPE,REIN 53X77,STERILE: Brand: MEDLINE

## (undated) DEVICE — GOWN,AURORA,NONREINF,RAGLAN,L,STERILE: Brand: MEDLINE

## (undated) DEVICE — SYRINGE MED 10ML POLYPR LUERSLIP TIP FLAT TOP W/O SFTY DISP

## (undated) DEVICE — ELECTRODE PT RET AD L9FT HI MOIST COND ADH HYDRGEL CORDED

## (undated) DEVICE — ADVANCE, 35LP LOW PROFILE PTA BALLOON DILATATION CATHETER: Brand: ADVANCE

## (undated) DEVICE — SOLUTION IRRIG 500ML 0.9% SOD CHL USP POUR PLAS BTL

## (undated) DEVICE — GLOVE SURG SZ 75 L12IN FNGR THK79MIL GRN LTX FREE

## (undated) DEVICE — GUIDEWIRE VASC STR 0.035 INX180 CM 15 CM BENT PTFE COAT STRT

## (undated) DEVICE — SET SURG INSTR MINI VASC

## (undated) DEVICE — GOWN,SIRUS,FABRNF,L,20/CS: Brand: MEDLINE

## (undated) DEVICE — SURGICAL PROCEDURE PACK VASC MAJ CUST

## (undated) DEVICE — 3M™ IOBAN™ 2 ANTIMICROBIAL INCISE DRAPE 6640EZ: Brand: IOBAN™ 2

## (undated) DEVICE — MICROPUNCTURE INTRODUCER SET SILHOUETTE TRANSITIONLESS PUSH-PLUS DESIGN - STIFFENED CANNULA WITH STAINLESS STEEL WIRE GUIDE: Brand: MICROPUNCTURE

## (undated) DEVICE — SYRINGE MED 10ML LUERLOCK TIP W/O SFTY DISP

## (undated) DEVICE — GLOVE SURG SZ 75 CRM LTX FREE POLYISOPRENE POLYMER BEAD ANTI

## (undated) DEVICE — MAGNETIC INSTR DRAPE 20X16: Brand: MEDLINE INDUSTRIES, INC.

## (undated) DEVICE — BASIC: Brand: MEDLINE INDUSTRIES, INC.

## (undated) DEVICE — CLOTH SURG PREP PREOPERATIVE CHLORHEXIDINE GLUC 2% READYPREP

## (undated) DEVICE — LABEL MED 4 IN SURG PANEL W/ PEN STRL

## (undated) DEVICE — SUTURE BOOTIES, YELLOW, STERILE, 5 PAIR/PAD; 5 PADS/BOX: Brand: KEY SURGICAL SUTURE BOOTIES

## (undated) DEVICE — ADHESIVE SKIN CLOSURE TOP 36 CC HI VISC DERMBND MINI

## (undated) DEVICE — APPLICATOR MEDICATED 26 CC SOLUTION HI LT ORNG CHLORAPREP

## (undated) DEVICE — SHEATH INTRO 8FR L12CM GWIRE 0.038IN W/ SMOOTH TAPERS TIP

## (undated) DEVICE — NEEDLE HYPO 25GA L1.5IN BLU POLYPR HUB S STL REG BVL STR

## (undated) DEVICE — ISOVUE 370 50ML